# Patient Record
Sex: FEMALE | Race: WHITE | Employment: FULL TIME | URBAN - METROPOLITAN AREA
[De-identification: names, ages, dates, MRNs, and addresses within clinical notes are randomized per-mention and may not be internally consistent; named-entity substitution may affect disease eponyms.]

---
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
## 2018-02-11 ENCOUNTER — HOSPITAL ENCOUNTER (INPATIENT)
Age: 18
LOS: 4 days | Discharge: HOME | DRG: 885 | End: 2018-02-16
Attending: EMERGENCY MEDICINE
Payer: COMMERCIAL

## 2018-02-11 DIAGNOSIS — R45.851 SUICIDAL IDEATIONS: ICD-10-CM

## 2018-02-11 DIAGNOSIS — F51.04 PSYCHOPHYSIOLOGICAL INSOMNIA: ICD-10-CM

## 2018-02-11 DIAGNOSIS — T50.904A DRUG OVERDOSE, UNDETERMINED INTENT, INITIAL ENCOUNTER: Primary | ICD-10-CM

## 2018-02-11 PROBLEM — F32.9 MDD (MAJOR DEPRESSIVE DISORDER): Status: ACTIVE | Noted: 2018-02-11

## 2018-02-11 LAB
ALBUMIN SERPL-MCNC: 4 G/DL (ref 3.5–5)
ALBUMIN/GLOB SERPL: 1 {RATIO} (ref 1.1–2.2)
ALP SERPL-CCNC: 70 U/L (ref 45–117)
ALT SERPL-CCNC: 22 U/L (ref 12–78)
AMPHET UR QL SCN: NEGATIVE
ANION GAP SERPL CALC-SCNC: 5 MMOL/L (ref 5–15)
APAP SERPL-MCNC: <2 UG/ML (ref 10–30)
APPEARANCE UR: CLEAR
AST SERPL-CCNC: 19 U/L (ref 15–37)
ATRIAL RATE: 75 BPM
BACTERIA URNS QL MICRO: NEGATIVE /HPF
BARBITURATES UR QL SCN: NEGATIVE
BASOPHILS # BLD: 0 K/UL (ref 0–0.1)
BASOPHILS NFR BLD: 0 % (ref 0–1)
BENZODIAZ UR QL: POSITIVE
BILIRUB SERPL-MCNC: 0.3 MG/DL (ref 0.2–1)
BILIRUB UR QL: NEGATIVE
BUN SERPL-MCNC: 15 MG/DL (ref 6–20)
BUN/CREAT SERPL: 21 (ref 12–20)
CALCIUM SERPL-MCNC: 9.1 MG/DL (ref 8.5–10.1)
CALCULATED P AXIS, ECG09: 66 DEGREES
CALCULATED R AXIS, ECG10: 75 DEGREES
CALCULATED T AXIS, ECG11: 32 DEGREES
CANNABINOIDS UR QL SCN: POSITIVE
CHLORIDE SERPL-SCNC: 103 MMOL/L (ref 97–108)
CO2 SERPL-SCNC: 29 MMOL/L (ref 21–32)
COCAINE UR QL SCN: NEGATIVE
COLOR UR: NORMAL
CREAT SERPL-MCNC: 0.73 MG/DL (ref 0.55–1.02)
DIAGNOSIS, 93000: NORMAL
DIFFERENTIAL METHOD BLD: NORMAL
DRUG SCRN COMMENT,DRGCM: ABNORMAL
EOSINOPHIL # BLD: 0.1 K/UL (ref 0–0.4)
EOSINOPHIL NFR BLD: 2 % (ref 0–7)
EPITH CASTS URNS QL MICRO: NORMAL /LPF
ERYTHROCYTE [DISTWIDTH] IN BLOOD BY AUTOMATED COUNT: 12.6 % (ref 11.5–14.5)
ETHANOL SERPL-MCNC: <10 MG/DL
GLOBULIN SER CALC-MCNC: 4.2 G/DL (ref 2–4)
GLUCOSE SERPL-MCNC: 81 MG/DL (ref 65–100)
GLUCOSE UR STRIP.AUTO-MCNC: NEGATIVE MG/DL
HCG UR QL: NEGATIVE
HCT VFR BLD AUTO: 40.4 % (ref 35–47)
HGB BLD-MCNC: 13.9 G/DL (ref 11.5–16)
HGB UR QL STRIP: NEGATIVE
HYALINE CASTS URNS QL MICRO: NORMAL /LPF (ref 0–5)
IMM GRANULOCYTES # BLD: 0 K/UL (ref 0–0.04)
IMM GRANULOCYTES NFR BLD AUTO: 0 % (ref 0–0.5)
KETONES UR QL STRIP.AUTO: NEGATIVE MG/DL
LEUKOCYTE ESTERASE UR QL STRIP.AUTO: NEGATIVE
LYMPHOCYTES # BLD: 2.5 K/UL (ref 0.8–3.5)
LYMPHOCYTES NFR BLD: 34 % (ref 12–49)
MCH RBC QN AUTO: 32.3 PG (ref 26–34)
MCHC RBC AUTO-ENTMCNC: 34.4 G/DL (ref 30–36.5)
MCV RBC AUTO: 93.7 FL (ref 80–99)
METHADONE UR QL: NEGATIVE
MONOCYTES # BLD: 0.6 K/UL (ref 0–1)
MONOCYTES NFR BLD: 9 % (ref 5–13)
NEUTS SEG # BLD: 4 K/UL (ref 1.8–8)
NEUTS SEG NFR BLD: 55 % (ref 32–75)
NITRITE UR QL STRIP.AUTO: NEGATIVE
NRBC # BLD: 0 K/UL (ref 0–0.01)
NRBC BLD-RTO: 0 PER 100 WBC
OPIATES UR QL: NEGATIVE
P-R INTERVAL, ECG05: 152 MS
PCP UR QL: NEGATIVE
PH UR STRIP: 6.5 [PH] (ref 5–8)
PLATELET # BLD AUTO: 213 K/UL (ref 150–400)
PMV BLD AUTO: 10.4 FL (ref 8.9–12.9)
POTASSIUM SERPL-SCNC: 3.3 MMOL/L (ref 3.5–5.1)
PROT SERPL-MCNC: 8.2 G/DL (ref 6.4–8.2)
PROT UR STRIP-MCNC: NEGATIVE MG/DL
Q-T INTERVAL, ECG07: 396 MS
QRS DURATION, ECG06: 72 MS
QTC CALCULATION (BEZET), ECG08: 442 MS
RBC # BLD AUTO: 4.31 M/UL (ref 3.8–5.2)
RBC #/AREA URNS HPF: NORMAL /HPF (ref 0–5)
SALICYLATES SERPL-MCNC: <1.7 MG/DL (ref 2.8–20)
SODIUM SERPL-SCNC: 137 MMOL/L (ref 136–145)
SP GR UR REFRACTOMETRY: 1.01 (ref 1–1.03)
UROBILINOGEN UR QL STRIP.AUTO: 0.2 EU/DL (ref 0.2–1)
VENTRICULAR RATE, ECG03: 75 BPM
WBC # BLD AUTO: 7.3 K/UL (ref 3.6–11)
WBC URNS QL MICRO: NORMAL /HPF (ref 0–4)

## 2018-02-11 PROCEDURE — 96360 HYDRATION IV INFUSION INIT: CPT

## 2018-02-11 PROCEDURE — 81001 URINALYSIS AUTO W/SCOPE: CPT | Performed by: EMERGENCY MEDICINE

## 2018-02-11 PROCEDURE — 90791 PSYCH DIAGNOSTIC EVALUATION: CPT

## 2018-02-11 PROCEDURE — 74011250636 HC RX REV CODE- 250/636: Performed by: EMERGENCY MEDICINE

## 2018-02-11 PROCEDURE — 93005 ELECTROCARDIOGRAM TRACING: CPT

## 2018-02-11 PROCEDURE — 51701 INSERT BLADDER CATHETER: CPT

## 2018-02-11 PROCEDURE — 80307 DRUG TEST PRSMV CHEM ANLYZR: CPT | Performed by: EMERGENCY MEDICINE

## 2018-02-11 PROCEDURE — 80053 COMPREHEN METABOLIC PANEL: CPT | Performed by: EMERGENCY MEDICINE

## 2018-02-11 PROCEDURE — 81025 URINE PREGNANCY TEST: CPT

## 2018-02-11 PROCEDURE — 77030005563 HC CATH URETH INT MMGH -A

## 2018-02-11 PROCEDURE — 96361 HYDRATE IV INFUSION ADD-ON: CPT

## 2018-02-11 PROCEDURE — 99285 EMERGENCY DEPT VISIT HI MDM: CPT

## 2018-02-11 PROCEDURE — 85025 COMPLETE CBC W/AUTO DIFF WBC: CPT | Performed by: EMERGENCY MEDICINE

## 2018-02-11 PROCEDURE — 36415 COLL VENOUS BLD VENIPUNCTURE: CPT | Performed by: EMERGENCY MEDICINE

## 2018-02-11 RX ADMIN — SODIUM CHLORIDE 1000 ML: 900 INJECTION, SOLUTION INTRAVENOUS at 04:46

## 2018-02-11 RX ADMIN — SODIUM CHLORIDE 1000 ML: 900 INJECTION, SOLUTION INTRAVENOUS at 17:14

## 2018-02-11 RX ADMIN — SODIUM CHLORIDE 1000 ML: 900 INJECTION, SOLUTION INTRAVENOUS at 13:39

## 2018-02-11 NOTE — ED NOTES
3:39 PM  Care assumed from Dr Anabella Westfall. Waiting on psych reevaluation. Will ambulate pt again in an hour. I went and evaluated pt. She is awake and alert. IVF are running. I spoke to pt for 10 min and she is in no distress, speaking and making sense. 5:17 PM  Pt walked around the nurses station with the nurse. Pt is talking and making sense. Vitals are WNL     BSMART will discuss w/ psychiatry    PROGRESS NOTE:  7:05 PM  Pt is up and walking around unassisted. Safe to be admitted to psychiatry.      PROGRESS NOTE:  7:26 PM  Psychiatry was concerned about the pt's blood pressure being too low earlier in the day, but it has since normalized. Pt is up ambulating and talking normally. Pt's most recent BP was 121/78 with pulse of 64.     I discussed case w/ psychiatry attending who will admit 8pm

## 2018-02-11 NOTE — ED NOTES
Patient alert, but lethargic. RN able to assist patient to [facility].     RN spoke with poison control, will continue to monitor

## 2018-02-11 NOTE — IP AVS SNAPSHOT
[address] 
[phone] Patient: Sujata Barnes MRN: [id number] IVL:1/97/9278 A check uriel indicates which time of day the medication should be taken. My Medications START taking these medications Instructions Each Dose to Equal  
 Morning Noon Evening Bedtime  
 citalopram 20 mg tablet Commonly known as:  Sosa Rajan Your next dose is:  Tomorrow at 9am  
   
 Take 1 Tab by mouth daily. Indications: Generalized Anxiety Disorder, major depressive disorder 20 mg  
    
  
   
   
   
  
 cloNIDine HCl 0.1 mg tablet Commonly known as:  CATAPRES Take 1 Tab by mouth nightly. Indications: ptsd, insomnia  
 0.1 mg  
    
   
   
   
  
 hydrOXYzine HCl 25 mg tablet Commonly known as:  ATARAX Take 1 Tab by mouth every six (6) hours as needed (anxiety) for up to 10 days. Indications: anxiety 25 mg  
    
   
   
   
  
 zolpidem 10 mg tablet Commonly known as:  AMBIEN Your next dose is: Tonight at bedtime Take 1 Tab by mouth nightly as needed for Sleep. Max Daily Amount: 10 mg. Indications: SLEEP-ONSET INSOMNIA 10 mg CONTINUE taking these medications Instructions Each Dose to Equal  
 Morning Noon Evening Bedtime MICROGESTIN FE 1/20 (28) 1 mg-20 mcg (21)/75 mg (7) Tab Generic drug:  norethindrone-ethinyl estradiol Your next dose is:  Tomorrow at 9am  
   
 Take 1 Tab by mouth daily. Indications: Pregnancy Contraception 1 Tab Where to Get Your Medications Information on where to get these meds will be given to you by the nurse or doctor. ! Ask your nurse or doctor about these medications  
  citalopram 20 mg tablet  
 cloNIDine HCl 0.1 mg tablet  
 hydrOXYzine HCl 25 mg tablet  
 zolpidem 10 mg tablet

## 2018-02-11 NOTE — ED NOTES
Patient and mother becoming agitated. Patient asking for personal belongings, RN explained protocol to patient that belongings will be secured at nurses station for safety reasons. Will ambulate patient at 1900. Patient aware.

## 2018-02-11 NOTE — LETTER
[address] Adult Emergency Department [address] [facility] [phone] 
[phone] Work/School Note Date: 2/11/2018 To Whom It May concern: 
 
Kimberly Leavitt was seen and treated today in the emergency room and subsequently admitted to the hospital by the following provider(s): 
Attending Provider: Danielle Schmitz MD. renee Rajesh was present with her daughter during her stay. Sincerely, Marshall Silveira RN

## 2018-02-11 NOTE — ED NOTES
Loud thud heard from room. This RN found patient standing up off of floor beside ED stretcher. Patient reports needing to urinate and climbing over bedside rails. Patient denies hitting head and denies pain at this time, concerned about needing to call work. Patient assisted to bedside commode and back into stretcher. VSS, no acute distress. Bed low and locked. Siderails up. Call bell within reach and patient redirected to use it when in need of assistance. Will continue to monitor. MD also made aware.

## 2018-02-11 NOTE — BSMART NOTE
This pt at one point was asking to leave and was refusing a psychiatric admission. HP officer in [facility] ER spoke with pt and described this process regarding an ECO and an evaluation for a TDO. Pt admitted to being fearful of a psychiatric admission as she has heard \"scary things about a psychiatric hospital.\"  Pt was provided with information pertaining to a possible admission and answered all questions pt had at this time about psychiatric facilities. Pt seemed more comfortable with an admission and has agreed to come in on a voluntary basis---once medically cleared. Pt is not stable medically nor psychiatrically at this time. Pt was tearful as information was given. Pt reports that \"if I leave the hospital, realistically, I will probably die, so I probably need to come in.\"  Pt is receiving fluids and continuing to be monitored as she was/is a fall risk. Once pt is ambulating independently and vitals are stable and ER physician marks pt as medically cleared, BSMART will contact bed board to reassess possible admission psychiatrically. Bed board has called to report that there may be an appropriate bed at [facility] bed-but case has to be discussed with on call psychiatrist for this facility, Dr. Mindy Stewart at that time.   Kassidy Larose LCSW

## 2018-02-11 NOTE — ED NOTES
Post Fall Documentation      Kimberly Becker unwitnessed fall occurred on 2/11/2018 (Date) at [id number] (Time). The answers to the following questions summarize the fall:     · In the patient's own words,:  · What was he/she doing when he/she fell? Attempting to walk to restroom    · What are his/her complaints? NONE    · Nurse:  · Document observation, treatment, conversation, follow-up, and patient response. Loud thud heard from room. This RN found patient standing up off of floor beside ED stretcher. Patient reports needing to urinate and climbing over bedside rails. Patient denies pain at this time, concerned about needing to call work. Patient assisted to bedside commode and back into stretcher. .    · What was the patient's condition when found (i.e., pain, symptoms, cuts, bruises)? NONE    · What specific complaints did the patient have? NONE    · What did the staff do when patient was found (i.e., vital signs, returned to bed with fall alarm, side rails up)? Assisted to bedside commode, assisted to bed, vitals retaken    Which physician was notified?  Nahomy Hudson, RN

## 2018-02-11 NOTE — ED TRIAGE NOTES
Patient arriving after attempting to kill herself; reports that she feels an accumulation of things building to which she feels suicidal every day. Patient took 20 pills of 2 mg of Xanax (not her prescription). Arriving AOx4, GCS 15 however reports she feels better now with all the xanax she took.

## 2018-02-11 NOTE — BSMART NOTE
Spoke with nurse caring for pt as well as Dr. Natalie Recio to report that pt's medical condition is such that psych is unable to admit at this time. Nurse reports that pt is unable to ambulate on her own at this time. Dr. Natalie Recio is looking into a possible medical TDO as pt wants to leave and is not stable medically for discharge.   Toby Kulkarni, JAISON

## 2018-02-11 NOTE — ED NOTES
Spoke with dr Monik Winn about need for medical clearance. He will call back    1:53 PM  Spoke with  he will not order medical tdo but if needs psych tdo she can get treatment through that. I have spoken with Maciel Lucero who will discuss with [facility]    2:26 PM  Per bsmart pt now wants to be voluntary.  They will reevaluate her when she can walk

## 2018-02-11 NOTE — ED NOTES
Spoke with poison control; reports to expect drowsiness. Recommends discharge when patient mentating appropriately and able to ambulate independantly.

## 2018-02-11 NOTE — BSMART NOTE
At this time, psychiatrist does not feel that this pt is medically stable enough to come to psychiatric unit. Pt has fallen and blood pressure low. Pt is not medically stable for admission to a psychiatric unit. Will discuss this with ER physician.   Toby Kulkarni LCSW

## 2018-02-11 NOTE — ED PROVIDER NOTES
Patient is a 21 y.o. female presenting with Ingested Medication. Drug Overdose   The problem has been gradually worsening. Pertinent negatives include no chest pain, no abdominal pain, no headaches and no shortness of breath. 21year old female with anxiety, presents after overdose on 10mg Xanax that she got from a friend around 2-3am this morning. She states she took them because she wants to die. She states her mom left her at the age of 10 and she has had self worth issues ever since. She states she has tried to kill herself before but never hospitalized. She denies alcohol or other drug use. Does not have a psychiatrist.     Past Medical History:   Diagnosis Date    Anxiety        Past Surgical History:   Procedure Laterality Date    HX WISDOM TEETH EXTRACTION           History reviewed. No pertinent family history. Social History     Social History    Marital status: SINGLE     Spouse name: N/A    Number of children: N/A    Years of education: N/A     Occupational History    Not on file. Social History Main Topics    Smoking status: Current Some Day Smoker    Smokeless tobacco: Never Used    Alcohol use Not on file    Drug use: Yes     Special: Marijuana, Cocaine    Sexual activity: Not on file     Other Topics Concern    Not on file     Social History Narrative         ALLERGIES: Amoxil [amoxicillin]    Review of Systems   Constitutional: Negative for fever. HENT: Negative for congestion. Eyes: Negative for visual disturbance. Respiratory: Negative for cough and shortness of breath. Cardiovascular: Negative for chest pain. Gastrointestinal: Negative for abdominal pain, nausea and vomiting. Endocrine: Negative for polyuria. Genitourinary: Negative for dysuria. Musculoskeletal: Negative for gait problem. Neurological: Negative for headaches. Psychiatric/Behavioral: Positive for dysphoric mood, self-injury and suicidal ideas.        Vitals:    02/11/18 0412   BP: 108/66   Pulse: 73   Resp: 16   Temp: 97.4 °F (36.3 °C)   SpO2: 100%   Weight: 45.4 kg (100 lb)   Height: 5' 2\" (1.575 m)            Physical Exam   Constitutional: She is oriented to person, place, and time. She appears well-developed and well-nourished. No distress. HENT:   Head: Normocephalic and atraumatic. Mouth/Throat: Oropharynx is clear and moist. No oropharyngeal exudate. Eyes: Conjunctivae and EOM are normal. Pupils are equal, round, and reactive to light. Right eye exhibits no discharge. Left eye exhibits no discharge. No scleral icterus. Neck: Normal range of motion. Neck supple. No JVD present. Cardiovascular: Normal rate, regular rhythm, normal heart sounds and intact distal pulses. Exam reveals no gallop and no friction rub. No murmur heard. Pulmonary/Chest: Effort normal and breath sounds normal. No stridor. No respiratory distress. She has no wheezes. She has no rales. She exhibits no tenderness. Abdominal: Soft. Bowel sounds are normal. She exhibits no distension and no mass. There is no tenderness. There is no rebound and no guarding. Musculoskeletal: Normal range of motion. She exhibits no edema or tenderness. Neurological: She is alert and oriented to person, place, and time. She has normal reflexes. No cranial nerve deficit. She exhibits normal muscle tone. Coordination normal.   Skin: Skin is warm and dry. No rash noted. No erythema. Psychiatric: Her behavior is normal. Judgment and thought content normal.   Flat affect. Sleepy but easily arousable. Can sit up and carry on a conversation        MDM      ED Course       Procedures      ED EKG interpretation:  Rhythm: normal sinus rhythm; and regular . Rate (approx.): 75; Axis: normal; P wave: normal; QRS interval: normal ; ST/T wave: non-specific changes. This EKG was interpreted by Enzo Art MD,ED Provider. Patient seen by Nadege Barbosa- agrees patient needs admission to psych floor. Will medically clear.      Medically cleared. Patient now saying she isn't sure she wants to stay. New BSMART in morning to come evaluate- consider TDO.

## 2018-02-11 NOTE — ED NOTES
Bedside and Verbal shift change report given to Andrey Aqq. 291 and Radha RN (oncoming nurse) by Leighton Yang and Jhoan Matamoros RN (offgoing nurse). Report included the following information SBAR, Kardex, ED Summary and MAR. PT was very difficult to arouse to painful stimuli; aroused after multiple sternal rubs. PT is confused.

## 2018-02-11 NOTE — BSMART NOTE
This evaluator has spoken with this pt today regarding admission to a psychiatric facility due to her presentation and actions prior to this ED admission. Pt, although not happy to miss work and be admitted psychiatrically, is willing to be admitted and a TDO evaluation is not warranted at this time. A bed at [facility] may become available after the psychiatrist makes rounds on the units today. Bed Board, Joe Moreno, is aware of this case and is hoping a bed will become available at this facility versus a transfer to another psychiatric facility. Pt is quiet but cooperative at this time.   Dee Armendariz, JAISON

## 2018-02-11 NOTE — ED NOTES
Pt found incontinent of urine. Straight cath performed to obtain urine sample for drug screen - no urine obtained. Pt cleansed and bedding changed.

## 2018-02-11 NOTE — BSMART NOTE
Comprehensive Assessment Form Part 1      Section I - Disposition    Axis I - Major Depressive d/o, recurrent, severe, without psychotic features  Axis II - deferred  Axis III - MRSA by hx (4/6/17)  Axis IV - multiple life stressors (work, school, room mates, family)   [place] V - 44      The Medical Doctor to Psychiatrist conference was not completed. Medical doctor is in agreement with psychiatrist disposition because this counselor conveyed to ED physician the recommendation of the on-call psychiatrist and they concurred. The plan is TBD. The on-call Psychiatrist consulted was Dr. Steven Stanley. The admitting Psychiatrist will be Dr. Carla Dueñas. 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 \" Patient is very lethargic but able to complete assessment. She reports several life stressors to include: stress at work ( at [employer]), relational problems with sorority sisters at [employer] with whom she lives, \"mom won't talk to me\", and \"grandmother has stopped calling me. \" Patient says she has been depressed since her mother left when she was 9 yo. Patient reports history of superficial cutting because \"the physical pain takes away the emotional pain. \"  Patient denies homicidal ideation, denies auditory/visual hallucinations, is not delusional, and is oriented X3. Patient's ETOH was not ordered, drug screen and pregnancy test are pending. (nurse attempted straight cath with no success).    Patient has no history of psych admissions, reports no previous suicide attempts, is not followed by a psychiatrist or counselor, or prescribed any psych medications. She reports poor sleep for the past 6 months getting about 5 hours sleep per night. She is currently a freshman at [employer] and says she usually lives with her parents live in [place]. Patient is amenable to a voluntary psychiatric admission. NOTE: Bed board reports no beds available at either [facility] or [facility] at this time. Once patient is cleared medically, lucid, and can ambulate next Bsmart counselor should call bed board to check on bed availability. The patient has demonstrated mental capacity to provide informed consent. The information is given by the patient and past medical records. The Chief Complaint is depression. The Precipitant Factors are multiple life stressors. Previous Hospitalizations: none reported  The patient has not previously been in restraints. Current Psychiatrist and/or  is n/a. Lethality Assessment:    The potential for suicide noted by the following: intent, current attempt, ideation and means . The potential for homicide is not noted. The patient has not been a perpetrator of sexual or physical abuse. There are not pending charges. The patient is felt to be at risk for self harm or harm to others. The attending nurse was advised to remove potentially harmful or dangerous items from the patient's room , to request a search of the patient's belongings, to remove patient clothing and place it out of immediate access to the patient, the patient is at risk for self harm and the patient needs supervision. Section III - Psychosocial  The patient's overall mood and attitude is depressed. Feelings of helplessness and hopelessness are not observed. Generalized anxiety is not observed. Panic is not observed. Phobias are not observed. Obsessive compulsive tendencies are not observed. Section IV - Mental Status Exam  The patient's appearance shows no evidence of impairment. The patient's behavior is guarded.  The patient is oriented to place, person and situation. The patient's speech is slowed and is soft. The patient's mood is depressed, is withdrawn and is sad. The range of affect is flat. The patient's thought content demonstrates no evidence of impairment. The thought process shows no evidence of impairment. The patient's perception shows no evidence of impairment. The patient's memory shows no evidence of impairment. The patient's appetite shows no evidence of impairment. The patient's sleep shows no evidence of impairment. The patient's insight shows no evidence of impairment. The patient's judgement shows no evidence of impairment. Section V - Substance Abuse  The patient is using substances. The patient is using benzodiazepines/barbiturates orally for 1-5 years with last use on 2/10/18. The patient has experienced the following withdrawal symptoms: N/A. Section VI - Living Arrangements  The patient is single. The patient lives with 7 room mates. The patient has no children. The patient does plan to return home upon discharge. The patient does not have legal issues pending. The patient's source of income comes from employment and family. Anglican and cultural practices have not been voiced at this time. The patient's greatest support comes from school counselor and this person will be involved with the treatment. The patient has not been in an event described as horrible or outside the realm of ordinary life experience either currently or in the past.  The patient has not been a victim of sexual/physical abuse. Section VII - Other Areas of Clinical Concern  The highest grade achieved is 1 year college with the overall quality of school experience being described as poor. The patient is currently a student and speaks [place] as a primary language. The patient has no communication impairments affecting communication.  The patient's preference for learning can be described as: can read and write adequately.   The patient's hearing is normal.  The patient's vision is normal.      Yvonne Cummins, LPC

## 2018-02-11 NOTE — ED NOTES
Patient contracts for safety while in ER; pt changed into gown and personal belongings placed at nurses station. Remains within site of nurses station.

## 2018-02-11 NOTE — IP AVS SNAPSHOT
[address] 
[phone] Patient: Desiree Davis MRN: [id number] EIY:[birth year] About your hospitalization You were admitted on:  February 12, 2018 You last received care in the:  [address] [place] [address] You were discharged on:  February 16, 2018 Why you were hospitalized Your primary diagnosis was:  Mdd (Major Depressive Disorder) Follow-up Information Follow up With Details Comments Contact Info Coleen Noel On 3/14/2018 You have a 10:30am appointment with the psychiatric nurse practitioner for medication management. [facility] 
[address] [facility], [place] [place], [address] [facility] 
[phone] Daniel Phipps On 2/28/2018 You have a 3:00pm appointment for therapy. [facility], [address] [facility] 
[phone] [facility] Call Please call to register. Individuals will learn to have greater distress tolerance, improved interpersonal effectiveness, increased emotional regulation, and the ability to be \"mindful\" rather than \"mind full. \" [facility]. [address] 
[phone] Sophie Rice MD   [address] [place] [address] 
[phone] Discharge Orders None A check uriel indicates which time of day the medication should be taken. My Medications START taking these medications Instructions Each Dose to Equal  
 Morning Noon Evening Bedtime  
 citalopram 20 mg tablet Commonly known as:  Viki Hernandez Your next dose is:  Tomorrow at 9am  
   
 Take 1 Tab by mouth daily. Indications: Generalized Anxiety Disorder, major depressive disorder 20 mg  
    
  
   
   
   
  
 cloNIDine HCl 0.1 mg tablet Commonly known as:  CATAPRES Take 1 Tab by mouth nightly.  Indications: ptsd, insomnia  
 0.1 mg  
    
   
 hydrOXYzine HCl 25 mg tablet Commonly known as:  ATARAX Take 1 Tab by mouth every six (6) hours as needed (anxiety) for up to 10 days. Indications: anxiety 25 mg  
    
   
   
   
  
 zolpidem 10 mg tablet Commonly known as:  AMBIEN Your next dose is: Tonight at bedtime Take 1 Tab by mouth nightly as needed for Sleep. Max Daily Amount: 10 mg. Indications: SLEEP-ONSET INSOMNIA 10 mg CONTINUE taking these medications Instructions Each Dose to Equal  
 Morning Noon Evening Bedtime MICROGESTIN FE  (28) 1 mg-20 mcg (21)/75 mg (7) Tab Generic drug:  norethindrone-ethinyl estradiol Your next dose is:  Tomorrow at 9am  
   
 Take 1 Tab by mouth daily. Indications: Pregnancy Contraception 1 Tab Where to Get Your Medications Information on where to get these meds will be given to you by the nurse or doctor. ! Ask your nurse or doctor about these medications  
  citalopram 20 mg tablet  
 cloNIDine HCl 0.1 mg tablet  
 hydrOXYzine HCl 25 mg tablet  
 zolpidem 10 mg tablet Discharge Instructions DISCHARGE SUMMARY 
 
NAME:Kimberly Rodas : 2000 MRN: [id number] The patient Maddy Duval exhibits the ability to control behavior in a less restrictive environment. Patient's level of functioning is improving. No assaultive/destructive behavior has been observed for the past 24 hours. No suicidal/homicidal threat or behavior has been observed for the past 24 hours. There is no evidence of serious medication side effects. Patient has not been in physical or protective restraints for at least the past 24 hours. If weapons involved, how are they secured? No weapons involved. Is patient aware of and in agreement with discharge plan? Yes Arrangements for medication:  Prescriptions given to patient. Referral for substance abuse treatment? Yes, Wilmer Elizalde Referral for smoking cessation needed? Yes, refused Copy of discharge instructions to provider?:  Sulema Cowan ([phone]); Joshua Norma ([phone]) Arrangements for transportation home:  Family to . Keep all follow up appointments as scheduled, continue to take prescribed medications per physician instructions. Mental health crisis number:  458 or your local mental health crisis line number at [phone]. DISCHARGE SUMMARY from Nurse PATIENT INSTRUCTIONS: 
 
What to do at Home: 
Recommended activity: Activity as tolerated, If you experience any of the following symptoms thoughts of harming self, feeling overwhelmed with anxiety, hopelessness or worthlessness, please follow up with your assigned providers and local crisis number. *  Please give a list of your current medications to your Primary Care Provider. *  Please update this list whenever your medications are discontinued, doses are 
    changed, or new medications (including over-the-counter products) are added. *  Please carry medication information at all times in case of emergency situations. These are general instructions for a healthy lifestyle: No smoking/ No tobacco products/ Avoid exposure to second hand smoke Surgeon General's Warning:  Quitting smoking now greatly reduces serious risk to your health. Obesity, smoking, and sedentary lifestyle greatly increases your risk for illness A healthy diet, regular physical exercise & weight monitoring are important for maintaining a healthy lifestyle You may be retaining fluid if you have a history of heart failure or if you experience any of the following symptoms:  Weight gain of 3 pounds or more overnight or 5 pounds in a week, increased swelling in our hands or feet or shortness of breath while lying flat in bed. Please call your doctor as soon as you notice any of these symptoms; do not wait until your next office visit. Recognize signs and symptoms of STROKE: 
 
F-face looks uneven A-arms unable to move or move unevenly S-speech slurred or non-existent T-time-call 911 as soon as signs and symptoms begin-DO NOT go Back to bed or wait to see if you get better-TIME IS BRAIN. Warning Signs of HEART ATTACK Call 911 if you have these symptoms: 
? Chest discomfort. Most heart attacks involve discomfort in the center of the chest that lasts more than a few minutes, or that goes away and comes back. It can feel like uncomfortable pressure, squeezing, fullness, or pain. ? Discomfort in other areas of the upper body. Symptoms can include pain or discomfort in one or both arms, the back, neck, jaw, or stomach. ? Shortness of breath with or without chest discomfort. ? Other signs may include breaking out in a cold sweat, nausea, or lightheadedness. Don't wait more than five minutes to call [address]! Fast action can save your life. Calling 911 is almost always the fastest way to get lifesaving treatment. Emergency Medical Services staff can begin treatment when they arrive  up to an hour sooner than if someone gets to the hospital by car. The discharge information has been reviewed with the patient. The patient verbalized understanding. Discharge medications reviewed with the patient and appropriate educational materials and side effects teaching were provided. ___________________________________________________________________________________________________________________________________ Virtual[employer] Announcement We are excited to announce that we are making your provider's discharge notes available to you in [employer]. You will see these notes when they are completed and signed by the physician that discharged you from your recent hospital stay.   If you have any questions or concerns about any information you see in VirtualUhart, please call the shopa Department where you were seen or reach out to your Primary Care Provider for more information about your plan of care. Introducing [facility]! Scarmissy Brown introduces [employer] patient portal. Now you can access parts of your medical record, email your doctor's office, and request medication refills online. 1. In your internet browser, go to [email or web address] 2. Click on the First Time User? Click Here link in the Sign In box. You will see the New Member Sign Up page. 3. Enter your [employer] Access Code exactly as it appears below. You will not need to use this code after youve completed the sign-up process. If you do not sign up before the expiration date, you must request a new code. · [employer] Access Code: [email or web address] Expires: 5/17/2018 11:48 AM 
 
4. Enter the last four digits of your Social Security Number (xxxx) and Date of Birth (mm/dd/yyyy) as indicated and click Submit. You will be taken to the next sign-up page. 5. Create a [employer] ID. This will be your [employer] login ID and cannot be changed, so think of one that is secure and easy to remember. 6. Create a [employer] password. You can change your password at any time. 7. Enter your Password Reset Question and Answer. This can be used at a later time if you forget your password. 8. Enter your e-mail address. You will receive e-mail notification when new information is available in [address] 9. Click Sign Up. You can now view and download portions of your medical record. 10. Click the Download Summary menu link to download a portable copy of your medical information. If you have questions, please visit the Frequently Asked Questions section of the [employer] website. Remember, [employer] is NOT to be used for urgent needs. For medical emergencies, dial 911. Now available from your iPhone and Android! Providers Seen During Your Hospitalization Provider Specialty Primary office phone Ani Patino MD Emergency Medicine [phone] Daniela Bay MD Emergency Medicine [phone] Rose Mary Caal MD Emergency Medicine [phone] Apolinar Morales MD Psychiatry [phone] Dale Degroot MD Psychiatry [phone] Immunizations Administered for This Admission Name Date Influenza Vaccine (Quad) PF 2/14/2018 Your Primary Care Physician (PCP) Primary Care Physician Office Phone Office Fax Unknown Harlan [phone] You are allergic to the following Allergen Reactions Amoxil (Amoxicillin) Hives Recent Documentation Height Weight BMI OB Status Smoking Status 1.575 m (18 %, Z= -0.90)* 45.4 kg (3 %, Z= -1.82)* 18.29 kg/m2 (8 %, Z= -1.38)* Having regular periods Current Some Day Smoker *Growth percentiles are based on CDC 2-20 Years data. Emergency Contacts Name Discharge Info Relation Home Work Mobile Kim Bojorquez DISCHARGE CAREGIVER [3] Parent [1]   [phone] Patient Belongings The following personal items are in your possession at time of discharge: 
  Dental Appliances: None  Visual Aid: None      Home Medications: None   Jewelry: None  Clothing: Pants, Shirt, Sweater (Sweater, shirt, pants)    Other Valuables: None  Personal Items Sent to Safe: None Please provide this summary of care documentation to your next provider. Signatures-by signing, you are acknowledging that this After Visit Summary has been reviewed with you and you have received a copy. Patient Signature:  ____________________________________________________________ Date:  ____________________________________________________________  
  
Kaylin Sleeper Provider Signature:  ____________________________________________________________ Date:  ____________________________________________________________

## 2018-02-11 NOTE — LETTER
[address] Emergency Department [address] [facility] [id number] 
[phone] Work/School Note Date: 2/11/2018 To Whom It May concern: 
 
Kimberly Ponce was seen and treated today in the emergency room by the following provider(s): 
Attending Provider: Ruth Bradley MD. Gerry Edwards was present with her daughter. Sincerely, Levar Jack RN

## 2018-02-11 NOTE — ED NOTES
8:01 AM  Change of shift. Care of patient taken over from dr painter; H&P reviewed, handoff complete. Awaiting labs/imaging/consultant. 10:21 AM  Pt seen by jennifer fisher she is still ooking for a bed    12:50 PM per rn pt tried to climb over bed rail and fell. Pt denying no evidence head injury and she denies.  FROM of all extremities no back pain

## 2018-02-12 PROCEDURE — 74011250637 HC RX REV CODE- 250/637: Performed by: PSYCHIATRY & NEUROLOGY

## 2018-02-12 PROCEDURE — 65220000003 HC RM SEMIPRIVATE PSYCH

## 2018-02-12 RX ORDER — IBUPROFEN 200 MG
1 TABLET ORAL
Status: DISCONTINUED | OUTPATIENT
Start: 2018-02-12 | End: 2018-02-16 | Stop reason: HOSPADM

## 2018-02-12 RX ORDER — NORETHINDRONE ACETATE AND ETHINYL ESTRADIOL 1MG-20(21)
1 KIT ORAL DAILY
COMMUNITY

## 2018-02-12 RX ORDER — MIRTAZAPINE 15 MG/1
7.5 TABLET, FILM COATED ORAL
Status: DISCONTINUED | OUTPATIENT
Start: 2018-02-12 | End: 2018-02-13

## 2018-02-12 RX ORDER — IBUPROFEN 400 MG/1
400 TABLET ORAL
Status: DISCONTINUED | OUTPATIENT
Start: 2018-02-12 | End: 2018-02-16 | Stop reason: HOSPADM

## 2018-02-12 RX ORDER — OLANZAPINE 2.5 MG/1
2.5 TABLET ORAL
Status: DISCONTINUED | OUTPATIENT
Start: 2018-02-12 | End: 2018-02-16 | Stop reason: HOSPADM

## 2018-02-12 RX ORDER — ZOLPIDEM TARTRATE 5 MG/1
5 TABLET ORAL
Status: DISCONTINUED | OUTPATIENT
Start: 2018-02-12 | End: 2018-02-14

## 2018-02-12 RX ORDER — ADHESIVE BANDAGE
30 BANDAGE TOPICAL DAILY PRN
Status: DISCONTINUED | OUTPATIENT
Start: 2018-02-12 | End: 2018-02-16 | Stop reason: HOSPADM

## 2018-02-12 RX ORDER — CITALOPRAM 20 MG/1
10 TABLET, FILM COATED ORAL DAILY
Status: DISCONTINUED | OUTPATIENT
Start: 2018-02-13 | End: 2018-02-14

## 2018-02-12 RX ORDER — BENZTROPINE MESYLATE 1 MG/1
1 TABLET ORAL
Status: DISCONTINUED | OUTPATIENT
Start: 2018-02-12 | End: 2018-02-16 | Stop reason: HOSPADM

## 2018-02-12 RX ORDER — BENZTROPINE MESYLATE 1 MG/ML
1 INJECTION INTRAMUSCULAR; INTRAVENOUS
Status: DISCONTINUED | OUTPATIENT
Start: 2018-02-12 | End: 2018-02-16 | Stop reason: HOSPADM

## 2018-02-12 RX ORDER — ACETAMINOPHEN 325 MG/1
650 TABLET ORAL
Status: DISCONTINUED | OUTPATIENT
Start: 2018-02-12 | End: 2018-02-16 | Stop reason: HOSPADM

## 2018-02-12 RX ADMIN — MIRTAZAPINE 7.5 MG: 15 TABLET, FILM COATED ORAL at 21:23

## 2018-02-12 NOTE — PROGRESS NOTES
Problem: Depressed Mood (Adult/Pediatric)  Goal: *STG: Remains safe in hospital  Outcome: Progressing Towards Goal  Pt out in milieu with peers for brief periods. Affect somewhat sad and depressed. Seems to enjoy coloring in room at times. Staff will continue to monitor q 15 min checks.

## 2018-02-12 NOTE — PROGRESS NOTES
Admission Medication Reconciliation:    Information obtained from:  Communication with Manuel Quintanilla ([phone])    Comments/Recommendations: Updated PTA meds/reviewed patient's allergies. 1)  Called pharmacy and patient has not filled at [facility] since 11/2017 (birth control) for 1 month        supply. Patient was previously prescribed sertraline in 8/2016. 2)  Removed:       - cefdinir       - diphenoxylate/atropine       - ondansetron       - oxycodone/APAP       - sertraline    3)  Changed:       - oral contraception changed to Microgestin FE 1/20       Significant PMH/Disease States:   Past Medical History:   Diagnosis Date    Anxiety      Chief Complaint for this Admission:    Chief Complaint   Patient presents with    Drug Overdose     Allergies:  Amoxil [amoxicillin]    Prior to Admission Medications:   Prior to Admission Medications   Prescriptions Last Dose Informant Patient Reported? Taking?   norethindrone-ethinyl estradiol (MICROGESTIN FE 1/20, 28,) 1 mg-20 mcg (21)/75 mg (7) tab   Yes No   Sig: Take 1 Tab by mouth daily.  Indications: Pregnancy Contraception      Facility-Administered Medications: None     Leticia Morrell, PharmD, BCPP, BCPS  Clinical Pharmacy Specialist, [facility]

## 2018-02-12 NOTE — BH NOTES
GROUP THERAPY PROGRESS NOTE    Kimberly Angeles participated in the Acute Unit's afternoon Process Group, with a focus on identifying feelings, planning for the rest of the day, and preparing for discharge. Group time: 50 minutes. Personal goal for participation: To increase the capacity to improve ones mood and structure. Goal orientation: The patient will be able to identify their feelings, develop a plan for structuring their day, and discharge planning. Group therapy participation: With prompting, this patient participated in the group. Therapeutic interventions reviewed and discussed: The group members were asked to introduce themselves to each other and to see if they could identify an emotion they are having and/or let the group know what they want to focus on for the day as they continue to make discharge plans. Impression of participation: The patient initially said she was feeling \"cold\" and left the room briefly to get a blanket from her room. She said she was feeling \"better\" and \"worried\" about a lot of things she might address if she were out of the hospital. She suggested, for example, that her car loan was due and she did not know how to deal with it from the hospital. She claimed not to have her credit/debit card with her and that she did not know where her wallet might be. It was suggested that she could try to call the lender of the loan to explain her situation so they know she is not trying to duck them. She said she doesn't have their telephone number and that she has not used information 21 [phone] or [phone]) before. She was encouraged to ask the staff for help with this if she needed to. She said she was looking forward to seeing her boyfriend tonight during visiting hours at 7 PM this evening. Aside from her boyfriend she said she feels no value in her work or school.  She expressed no SI/HI but admitted feeling as if she could not see a future for herself and was thinking about dropping out of school. She was alert and generally oriented. She displayed no overt psychotic symptoms in this group. Her affect was very depressed, with some anxiety. Her mood matched her affect. She expressed a deep sense of emptiness in her life, aside from her boyfriend.

## 2018-02-12 NOTE — ED NOTES
Patient updated on plan for waiting for inpatient bed; patient stated that she was ready to go and would like to leave. Patient reminded of status of potential TDO if she did not remain voluntary. Patient stated \"do whatever you have to do'. Christa Zapata MD aware, Gallo Gonzalez [facility]) aware. Gallo Gonzalez at bedside to discuss.

## 2018-02-12 NOTE — ROUTINE PROCESS
TRANSFER - IN REPORT:    Verbal report received from Lucita Shaffer on [address]  being received from [facility] ED for routine progression of care      Report consisted of patients Situation, Background, Assessment and   Recommendations(SBAR). Information from the following report(s) SBAR was reviewed with the receiving nurse. Opportunity for questions and clarification was provided. Assessment completed upon patients arrival to unit and care assumed.

## 2018-02-12 NOTE — ROUTINE PROCESS
Primary Nurse Neela Arora and Anjel Caal RN performed a dual skin assessment on this patient No impairment noted  Elkin score is 23    Pressure Injury Documentation  (COMPLETE ONE LABEL PER PRESSURE INJURY)  For further information, please review corresponding Wound Care flowsheet. Pt noted to have Multiple tattos over body areas; Bruise on Left Hip (per pt - from fall PTA); 3 earrings in Left ear and 2 earrings in R ear.

## 2018-02-12 NOTE — PROGRESS NOTES
Problem: Suicide/Homicide (Adult/Pediatric) Meet by 2/20/18  Goal: *STG: Remains safe in hospital  Outcome: Progressing Towards Goal  Pt remains safe in hospital on q 15 min safety checks. No self harming behaviors observed. Pt reports having SI. Contracts for safety. Goal: *STG: Seeks staff when feelings of self harm or harm towards others arise  Outcome: Progressing Towards Goal  Pt alert oriented. Anxious calm. Goal: *STG: Attends activities and groups  Outcome: Progressing Towards Goal  Pt encouraged to attend groups and activities. Pt visible on unit. Showered. Pt meeting with treatment team. Pt tearful. Reports poor sleep, unmanaged anxiety and depression. Discuss history of trauma. Plan to start Celexa for depression, Remeron at bedtime. Journal provided. Pt attending process group.        [address] [id number]  Master Treatment Plan for Boogie Ho    Date Treatment Plan Initiated: 2/12/18    Treatment Plan Modalities:  Type of Modality Amount  (x minutes) Frequency (x/week) Duration (x days) Name of Responsible Staff   Community & wrap-up meetings to encourage peer interactions 15 7 1    Mick PEARL   Group psychotherapy to assist in building coping skills and internal controls 60 7 1 Cyrus Pereyra LCSW   Therapeutic activity groups to build coping skills 60 7 1 Cyrus Pereyra LCSW   Psychoeducation in group setting to address:   Medication education   15 7 1 Jackie WONG   Coping skills         Relaxation techniques         Symptom management         Discharge planning         Spirituality    [phone] [facility]   60 1 1 Volunteer from [place]   Recovery/AA/NA   61 3 1 Volunteer from [address] [place] [address] medication management   15 7 1 Dr. Billee Meigs

## 2018-02-12 NOTE — ED NOTES
Patient able to ambulate independently with steady gait. MD and [facility].

## 2018-02-12 NOTE — ED NOTES
Patient becoming belligerent with staff; reports that she wants to leave. Currently awaiting TDO paperwork. [employer] officer at bedside attempting to [facility] patient.

## 2018-02-12 NOTE — ED NOTES
PROGRESS NOTE:  7:05 PM  Pt is up and walking around unassisted. Safe to be admitted to psychiatry. PROGRESS NOTE:  7:26 PM  Psychiatry was concerned about the pt's blood pressure being too low earlier in the day, but it has since normalized. Pt is up ambulating and talking normally. Pt's most recent BP was 121/78 with pulse of 64.
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
## 2018-02-12 NOTE — ROUTINE PROCESS
TRANSFER - OUT REPORT:    Verbal report given to Monse Marinelli RN(name) on Jose Enrique Seaman  being transferred to [address] [place] [address](unit) for routine progression of care       Report consisted of patients Situation, Background, Assessment and   Recommendations(SBAR). Information from the following report(s) SBAR, ED Summary, Procedure Summary, MAR and Recent Results was reviewed with the receiving nurse. Lines:   Peripheral IV 02/11/18 Left Antecubital (Active)   Site Assessment Clean, dry, & intact 2/11/2018  4:14 AM   Phlebitis Assessment 0 2/11/2018  4:14 AM   Infiltration Assessment 0 2/11/2018  4:14 AM   Dressing Status Clean, dry, & intact 2/11/2018  4:14 AM   Hub Color/Line Status Pink;Flushed 2/11/2018  4:14 AM   Action Taken Blood drawn 2/11/2018  4:14 AM        Opportunity for questions and clarification was provided.       Patient transported with:   Registered Nurse

## 2018-02-12 NOTE — ED NOTES
Patient [address] [facility] by DELFINO at [phone]. Remains at bedside. Page out to [facility] to make aware of situation.

## 2018-02-12 NOTE — INTERDISCIPLINARY ROUNDS
Behavioral Health Interdisciplinary Rounds     Patient Name: Michael Anderson  Age: 21 y.o. Room/Bed:  736/  Primary Diagnosis: <principal problem not specified>   Admission Status: TDO     Readmission within 30 days: no  Power of  in place: no  Patient requires a blocked bed: yes          Reason for blocked bed: MRSA    VTE Prophylaxis: No  Flu vaccine given : no - To be administered prior to discharge  Mobility needs/Fall risk: yes    Nutritional Plan: no  Consults:          Labs/Testing due today?: no    Sleep hours: 3.75       Participation in Care/Groups:  No - New Admission  Medication Compliant?: No scheduled Medications ordered. New Admission  PRNS (last 24 hours): None    Restraints (last 24 hours):  no  Substance Abuse:  Yes - (THC)  CIWA (range last 24 hours):  COWS (range last 24 hours):   Alcohol screening (AUDIT) completed -     If applicable, date SBIRT discussed in treatment team AND documented:   Tobacco - patient is a smoker: yes   Date tobacco education completed by RN: 2/12/18 (Needs Reinforcement)  24 hour chart check complete: no - New Admision    Patient goal(s) for today: To not have suicidal thoughts  Treatment team focus/goals: Stabilize on medication  Progress note  Patient tearful and distraught. She is alert and oriented and willing to stay for treatment.     LOS:  0  Expected LOS: TDO hearing, 2/14/18    Financial concerns/prescription coverage:    Date of last family contact:   No    Family requesting physician contact today:  No  Discharge plan:  Return to [facility] when stable for discharge  Guns in the home:  No       Outpatient provider(s): [facility]    Participating treatment team members: Dr. Ana Hong; Claudean Stai, PharmD; Jannie Caldwell

## 2018-02-12 NOTE — BSMART NOTE
Patient is now refusing admission again. Discussed TDO evaluation with patient and in reference to calling [facility] patient stated \"Call them! .\"  Enriqueta Aid and spoke with Alireza Alanis. Faxed requested information and they will respond.

## 2018-02-12 NOTE — BH NOTES
Admission Note:    This 21year old WF was admitted via TDO status from [facility] to the Professional Services of Dr. Genie Brothers. Diagnosis : Major Depressive Disorder. Reason for Admission   Suicide Attempt Was brought to ED via EMS after overdosing on 20 pills of 2 mg Xanax (Was a friends prescription). Precipitating Factors:  Stressors at work, Relationship issues with Sorority sisters at [employer] and depression since age 10 when abandoned by mother. History Of: Superficial cutting because the physical pain takes away the emotional pain.                        MRSA   Substance Abuse History:     Cocaine (Occasionally at parties when offered) & Marijuana (Daily)   Denies Alcohol Use    Unit Presentation:  Calm & cooperative with admission process. Depressed mood; tearful at times. Currently denies suicidal ideation. Currently denies homicidal ideation. Patient verbally contracts for safety. Was given unit handbook and educated on its contents.                            Elkin Score = 23; Mews = 1

## 2018-02-13 LAB
ALBUMIN SERPL-MCNC: 4.5 G/DL (ref 3.5–5)
ALBUMIN/GLOB SERPL: 1 {RATIO} (ref 1.1–2.2)
ALP SERPL-CCNC: 78 U/L (ref 45–117)
ALT SERPL-CCNC: 23 U/L (ref 12–78)
ANION GAP SERPL CALC-SCNC: 6 MMOL/L (ref 5–15)
AST SERPL-CCNC: 25 U/L (ref 15–37)
BACTERIA SPEC CULT: NORMAL
BACTERIA SPEC CULT: NORMAL
BILIRUB SERPL-MCNC: 0.6 MG/DL (ref 0.2–1)
BUN SERPL-MCNC: 13 MG/DL (ref 6–20)
BUN/CREAT SERPL: 16 (ref 12–20)
CALCIUM SERPL-MCNC: 9.4 MG/DL (ref 8.5–10.1)
CHLORIDE SERPL-SCNC: 104 MMOL/L (ref 97–108)
CHOLEST SERPL-MCNC: 238 MG/DL
CO2 SERPL-SCNC: 27 MMOL/L (ref 21–32)
CREAT SERPL-MCNC: 0.81 MG/DL (ref 0.55–1.02)
GLOBULIN SER CALC-MCNC: 4.4 G/DL (ref 2–4)
GLUCOSE P FAST SERPL-MCNC: 82 MG/DL (ref 65–100)
GLUCOSE SERPL-MCNC: 82 MG/DL (ref 65–100)
HDLC SERPL-MCNC: 90 MG/DL
HDLC SERPL: 2.6 {RATIO} (ref 0–5)
LDLC SERPL CALC-MCNC: 133.2 MG/DL (ref 0–100)
LIPID PROFILE,FLP: ABNORMAL
POTASSIUM SERPL-SCNC: 4.7 MMOL/L (ref 3.5–5.1)
PROT SERPL-MCNC: 8.9 G/DL (ref 6.4–8.2)
SERVICE CMNT-IMP: NORMAL
SODIUM SERPL-SCNC: 137 MMOL/L (ref 136–145)
T4 FREE SERPL-MCNC: 1 NG/DL (ref 0.8–1.5)
TRIGL SERPL-MCNC: 74 MG/DL (ref ?–150)
TSH SERPL DL<=0.05 MIU/L-ACNC: 5.62 UIU/ML (ref 0.36–3.74)
TSH SERPL DL<=0.05 MIU/L-ACNC: 5.71 UIU/ML (ref 0.36–3.74)
VLDLC SERPL CALC-MCNC: 14.8 MG/DL

## 2018-02-13 PROCEDURE — 82947 ASSAY GLUCOSE BLOOD QUANT: CPT

## 2018-02-13 PROCEDURE — 84443 ASSAY THYROID STIM HORMONE: CPT

## 2018-02-13 PROCEDURE — 74011250637 HC RX REV CODE- 250/637: Performed by: PSYCHIATRY & NEUROLOGY

## 2018-02-13 PROCEDURE — 80061 LIPID PANEL: CPT

## 2018-02-13 PROCEDURE — 84439 ASSAY OF FREE THYROXINE: CPT | Performed by: PSYCHIATRY & NEUROLOGY

## 2018-02-13 PROCEDURE — 65220000003 HC RM SEMIPRIVATE PSYCH

## 2018-02-13 PROCEDURE — 36415 COLL VENOUS BLD VENIPUNCTURE: CPT

## 2018-02-13 PROCEDURE — 80053 COMPREHEN METABOLIC PANEL: CPT

## 2018-02-13 PROCEDURE — 84443 ASSAY THYROID STIM HORMONE: CPT | Performed by: PSYCHIATRY & NEUROLOGY

## 2018-02-13 RX ORDER — MIRTAZAPINE 15 MG/1
15 TABLET, FILM COATED ORAL
Status: DISCONTINUED | OUTPATIENT
Start: 2018-02-13 | End: 2018-02-14

## 2018-02-13 RX ADMIN — CITALOPRAM HYDROBROMIDE 10 MG: 20 TABLET ORAL at 08:37

## 2018-02-13 RX ADMIN — MIRTAZAPINE 15 MG: 15 TABLET, FILM COATED ORAL at 21:07

## 2018-02-13 NOTE — PROGRESS NOTES
Problem: Falls - Risk of  Goal: *Absence of Falls  Document Mikey Fall Risk and appropriate interventions in the flowsheet. Fall Risk Interventions:Gait is steady . VSS. Wearing slip resistant footwear. No falls reported or observed on this shift. Mentation Interventions: More frequent rounding    Medication Interventions: Teach patient to arise slowly    Elimination Interventions: Toilet paper/wipes in reach             Problem: Suicide/Homicide (Adult/Pediatric)  Goal: *STG: Remains safe in hospital  Outcome: Progressing Towards Goal  Pt denies any suicidal or homicidal thoughts. Contracts for safety. Remains on q 15 min safety checks. Goal: *STG/LTG: Complies with medication therapy  Outcome: Progressing Towards Goal  Pt denies any suicidal or homicidal thoughts. Contracts for safety. Remains on q 15 min safety checks. Problem: Depressed Mood (Adult/Pediatric)  Goal: *STG: Demonstrates reduction in symptoms and increase in insight into coping skills/future focused  Outcome: Progressing Towards Goal  Self reports depression and anxiety slightly better. Has been visible on unit. Encouraged group attendance. Has requested Pastoral care which has been paged.
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
## 2018-02-13 NOTE — PROGRESS NOTES
Problem: Depressed Mood (Adult/Pediatric)  Goal: *STG: Remains safe in hospital  Outcome: Progressing Towards Goal  Patient is alert, calm and verbal.  Participating in group on the General Unit. No distress noted. Patient remains safe in hospital.    TRANSFER - OUT REPORT:    Verbal report given to Yasemin Burks (MARVIN) on [employer]  being transferred to General Unit for routine progression of care       Report consisted of patients Situation, Background, Assessment and   Recommendations(SBAR). Information from the following report(s) SBAR was reviewed with the receiving nurse. Opportunity for questions and clarification was provided.       Patient transported with:   Registered Nurse
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
## 2018-02-13 NOTE — BH NOTES
GROUP THERAPY PROGRESS NOTE    The patient Kimberly Ponce is participating in [facility]. Group time: 30 minutes    Personal goal for participation: to orient the patient to the unit.     Goal orientation: successful adoption of unit rules    Group therapy participation: active    Therapeutic interventions reviewed and discussed: Yes    Impression of participation:     Briana White  2/13/2018 9:54 AM

## 2018-02-13 NOTE — BH NOTES
GROUP THERAPY PROGRESS NOTE    Kimberly Vincent Gonzales is participating in Reflections. Group time: 25 minutes    Personal goal for participation: Unit orientation and daily progress    Goal orientation: personal    Group therapy participation: active    Therapeutic interventions reviewed and discussed: \"Red Flags\" and word search handouts. Impression of participation: Seems in fair spirits. Comments and questions appropriate to group discussion. Adjusting to unit okay.
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
## 2018-02-13 NOTE — PROGRESS NOTES
Problem: Falls - Risk of  Goal: *Absence of Falls  Document Mikey Fall Risk and appropriate interventions in the flowsheet. Outcome: Progressing Towards Goal  Fall Risk Interventions:  Pt is fall free       Mentation Interventions: More frequent rounding    Medication Interventions: Teach patient to arise slowly    Elimination Interventions:  Toileting schedule/hourly rounds

## 2018-02-13 NOTE — BH NOTES
Behavioral Health Interdisciplinary Rounds     Patient Name: Beverley Johnson  Age: 21 y.o. Room/Bed:  736/01  Primary Diagnosis: MDD (major depressive disorder)   Admission Status: TDO     Readmission within 30 days: no  Power of  in place: no  Patient requires a blocked bed: yes          Reason for blocked bed: MRSA    VTE Prophylaxis: Not indicated  Flu vaccine given : no   Mobility needs/Fall risk: no    Nutritional Plan: no  Consults: n/a         Labs/Testing due today?: yes    Sleep hours: 7.15       Participation in Care/Groups:  yes  Medication Compliant?: Yes  PRNS (last 24 hours): None    Restraints (last 24 hours):  no  Substance Abuse:  yes  CIWA (range last 24 hours): 0 COWS (range last 24 hours): 0  Alcohol screening (AUDIT) completed -  AUDIT Score: 8  If applicable, date SBIRT discussed in treatment team AND documented:   AUDIT Screen Score: AUDIT Score: 8      Document Brief Intervention (corresponds directly with the 5 A's, Ask, Advise, Assess, Assist, and Arrange): At- Risk Patients (Score 7-15 for women; 8-15 for men)  Discuss concern patient is drinking at unhealthy levels known to increase risk of alcohol-related health problems. Is Patient ready to commit to change? Yes     If Yes:   Set goal- stop drinking    Plan- refer to IOP   [employer] provided- yes             Tobacco - patient is a smoker: yes   Date tobacco education completed by RN: 2/12/18  24 hour chart check complete: yes     Patient goal(s) for today:   Treatment team focus/goals: Plan for her hearing tomorrow. Plan to transfer to the general unit . Progress note She has been pleasant and compliant with her treatment. She talked about her stressors. She denies SI. She remains depressed.       LOS:  1  Expected LOS: TBD     Financial concerns/prescription coverage:  [employer]   Date of last family contact:       Family requesting physician contact today:    Discharge plan: She is considering returning home with her grandmother.    Guns in the home:  no       Outpatient provider(s): set up with [facility]     Participating treatment team members: Eugenie Diaz -

## 2018-02-14 PROCEDURE — 74011250637 HC RX REV CODE- 250/637: Performed by: PSYCHIATRY & NEUROLOGY

## 2018-02-14 PROCEDURE — 65220000003 HC RM SEMIPRIVATE PSYCH

## 2018-02-14 PROCEDURE — 74011250636 HC RX REV CODE- 250/636

## 2018-02-14 PROCEDURE — 90471 IMMUNIZATION ADMIN: CPT

## 2018-02-14 PROCEDURE — 90686 IIV4 VACC NO PRSV 0.5 ML IM: CPT

## 2018-02-14 RX ORDER — PRAZOSIN HYDROCHLORIDE 1 MG/1
1 CAPSULE ORAL
Status: DISCONTINUED | OUTPATIENT
Start: 2018-02-14 | End: 2018-02-15

## 2018-02-14 RX ORDER — ZOLPIDEM TARTRATE 10 MG/1
10 TABLET ORAL
Status: DISCONTINUED | OUTPATIENT
Start: 2018-02-14 | End: 2018-02-16 | Stop reason: HOSPADM

## 2018-02-14 RX ORDER — CITALOPRAM 20 MG/1
20 TABLET, FILM COATED ORAL DAILY
Status: DISCONTINUED | OUTPATIENT
Start: 2018-02-15 | End: 2018-02-16 | Stop reason: HOSPADM

## 2018-02-14 RX ADMIN — ZOLPIDEM TARTRATE 10 MG: 10 TABLET ORAL at 21:11

## 2018-02-14 RX ADMIN — PRAZOSIN HYDROCHLORIDE 1 MG: 1 CAPSULE ORAL at 21:11

## 2018-02-14 RX ADMIN — CITALOPRAM HYDROBROMIDE 10 MG: 20 TABLET ORAL at 08:56

## 2018-02-14 RX ADMIN — INFLUENZA VIRUS VACCINE 0.5 ML: 15; 15; 15; 15 SUSPENSION INTRAMUSCULAR at 08:58

## 2018-02-14 NOTE — BH NOTES
GROUP THERAPY PROGRESS NOTE    Kimberly Gonzales is participating in Reflections. Group time: 20 minutes    Personal goal for participation: daily progress    Goal orientation: personal    Group therapy participation: passive    Therapeutic interventions reviewed and discussed: yes    Impression of participation: Seems in fair spirits. Adjusting to transfer okay.

## 2018-02-14 NOTE — PROGRESS NOTES
Problem: Falls - Risk of  Goal: *Absence of Falls  Document Mikey Fall Risk and appropriate interventions in the flowsheet. Outcome: Progressing Towards Goal  Fall Risk Interventions:       Mentation Interventions: Adequate sleep, hydration, pain control    Medication Interventions: Teach patient to arise slowly    Elimination Interventions: Toileting schedule/hourly rounds    History of Falls Interventions: Room close to nurse's station    Resting in bed with eyes closed, no complaints, no distress noted. Safety measures in place, will continue to monitor.

## 2018-02-14 NOTE — PROGRESS NOTES
Problem: Depressed Mood (Adult/Pediatric)  Goal: *STG: Participates in treatment plan  Outcome: Progressing Towards Goal  Review meds, out on unit social w peers and staff. Mood and affect smiling and brighter when engaged. Daily goal is to talk with tx team about follow up care. Writing daily goal and completing her WRAP plan handout. TDO hearing outcome is voluntary committed.  Staff focus is on coping skills education

## 2018-02-14 NOTE — PROGRESS NOTES
Problem: Depressed Mood (Adult/Pediatric)  Goal: *STG: Participates in treatment plan  Outcome: Progressing Towards Goal  Patient participated in Reflections Group. Oriented to unit. Voiced no complaints. Visible in DR. Honorio brownlee.

## 2018-02-14 NOTE — BH NOTES
GROUP THERAPY PROGRESS NOTE    Kimberly Becker participated in a Process Group on the General Unit with a focus identifying feelings,   planning for the day, and learning more about DBT concepts of \"Interpersonal Effectiveness and using the   [address] as a long-term personal treatment plan. .  Group time: 70 minutes. Personal goal for participation: To increase the capacity to improve ones mood, set personal goals,   and understand more about basic activities to successfully state and get ones needs met. Goal orientation: The patients will be able to identify their feelings and develop a goal for   themselves for their day. The didactic portion of the session covered two primary topics  one on interpersonal   effectiveness and the second on the [address] as a long-term personal treatment plan. First, they were also asked to review a handout sheet on interpersonal effectiveness and asking   for something necessary. Three main areas of focus were explored in the interactive didactic   session: 1) defining what it is one wants (describe the current situation, express your feelings   and opinions, assert  yourself by asking and being willing to say No. 2) keeping a good   relationship with the person you are asking (be gentle and courteous in ones approach  no   attacks, threats, or judging; listen and be interested in the other person; validate the other   persons feelings and needs; and use an easy manner  be diplomatic and use humor   when you can); and 3) maintaining ones self-respect (be fair to yourself and to the other   person; no apologies for being alive or making a request at all, stick to your values, and be   truthful  dont lie, act helpless, exaggerate, or make excuses).     Second they were asked to consider filling in on their own after group the following elements   of a DBT house drawing with multiple levels:  1) foundation - values that govern your life;   2) first floor with a door  behaviors would like to manage and feel more control over or areas   you want to change; the door represents an opportunity to list or draw things that you keep   hidden from others; 3) second floor  list or draw emotions you want to experience more often,  more fully, or in a more healthy fashion; 4) third floor  a list of things that make you happy or   want to feel happy about; 5) attic  list or draw what  a Life Ximena Melton would look like. There  is also a roof, where people and things that protect you can be listed. The chimney provides an   opportunity to list ways in which you blow off steam. The billboard allows one to post those things   in one's life they are proud of and the walls of the house provide an opportunity to list those people   and things that provide support. The patients were also provided copies of the DBT summary on  interpersonal effectiveness and the [address] that they could complete on their own. Group therapy participation: With prompting, this patient participated in the group. Therapeutic interventions reviewed and discussed: The group members were asked to   identify an emotion they are having and/or let the group know what they want to focus on for the   day as they continue to make discharge plans. The group members took turns reading and   reviewing the summary sheet on Interpersonal Effectiveness and the elements of the [facility]. Impression of participation: The patient said she was feeling \"Good. ..better. \" She said she  anticipated being discharged perhaps as early as the end of this week and that she has come  to realize that she needs to practice \"speaking up for myself\" in a more successful fashion. She said she appreciated the handout for the group today. She was commended for speaking  up for herself and identifying with the struggles voiced be a couple of her peers.  She added that  she is feeling less alone through her interactions in group and in the milieu. Her affect was slightly  less depressed and anxious than she was when she first entered the hospital. She continues  to make good use of group therapy. Her mood reflected her affect. She is showing more capacity  for insight and judgment than on her admission. The patient expressed no current SI/HI and displayed no overt psychotic symptoms in this group.

## 2018-02-14 NOTE — BH NOTES
TRANSFER - IN REPORT:    Verbal report received from Renita Cecilia Peterson Jurgen  on [address]  being received from [address] [place] [address] Unit for routine progression of care      Report consisted of patients Situation, Background, Assessment and   Recommendations(SBAR). Information from the following report(s) SBAR, Kardex and Recent Results was reviewed with the receiving nurse. Opportunity for questions and clarification was provided. Assessment completed upon patients arrival to unit and care assumed.

## 2018-02-14 NOTE — INTERDISCIPLINARY ROUNDS
Behavioral Health Interdisciplinary Rounds     Patient Name: Chirag Tomas  Age: 21 y.o. Room/Bed:  729/02  Primary Diagnosis: MDD (major depressive disorder)   Admission Status: TDO     Readmission within 30 days: no  Power of  in place: no  Patient requires a blocked bed: no          Reason for blocked bed:     VTE Prophylaxis: No  Flu vaccine given : no To be administered prior to discharge  Mobility needs/Fall risk:yes   Nutritional Plan: no  Consults:          Labs/Testing due today?: no    Sleep hours: 6.15       Participation in Care/Groups:  yes  Medication Compliant?: Yes  PRNS (last 24 hours): None    Restraints (last 24 hours):  no  Substance Abuse:  Yes, THC CIWA (range last 24 hours): * COWS (range last 24 hours):   Alcohol screening (AUDIT) completed -  AUDIT Score: 8  If applicable, date SBIRT discussed in treatment team AND documented:   Tobacco - patient is a smoker: yes   Date tobacco education completed by RN: 2/12/18, Needs Reinforcement    24 hour chart check complete: no     Patient goal(s) for today: attend all groups; contact   Treatment team focus/goals: Contact Vinay Buchanan ()  Progress note: Patient reports future-oriented thinking and feeling support from family     LOS:  2  Expected LOS: TBD    Financial concerns/prescription coverage: Pawel Curiel  Date of last family contact: None     Family requesting physician contact today: No  Discharge plan: Considering returning home to her grandmother  Guns in the home: No      Outpatient provider(s): Link to [facility]    Participating treatment team members: Chirag Tomas, Lisa Graves MSW; Dr. Helena Gibson MD; Marjorie Tomas, RN; Hank Pena, PharmD;  Lacy Hamman, Pharmacy student

## 2018-02-15 PROCEDURE — 74011250637 HC RX REV CODE- 250/637: Performed by: PSYCHIATRY & NEUROLOGY

## 2018-02-15 PROCEDURE — 74011250637 HC RX REV CODE- 250/637

## 2018-02-15 PROCEDURE — 65220000003 HC RM SEMIPRIVATE PSYCH

## 2018-02-15 RX ORDER — HYDROXYZINE 25 MG/1
25 TABLET, FILM COATED ORAL
Status: DISCONTINUED | OUTPATIENT
Start: 2018-02-15 | End: 2018-02-16 | Stop reason: HOSPADM

## 2018-02-15 RX ADMIN — CITALOPRAM HYDROBROMIDE 20 MG: 20 TABLET ORAL at 08:51

## 2018-02-15 RX ADMIN — ACETAMINOPHEN 650 MG: 325 TABLET, FILM COATED ORAL at 11:49

## 2018-02-15 RX ADMIN — ZOLPIDEM TARTRATE 10 MG: 10 TABLET ORAL at 21:21

## 2018-02-15 RX ADMIN — HYDROXYZINE HYDROCHLORIDE 25 MG: 25 TABLET ORAL at 14:15
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
## 2018-02-15 NOTE — INTERDISCIPLINARY ROUNDS
Behavioral Health Interdisciplinary Rounds     Patient Name: Nader Johnston  Age: 21 y.o. Room/Bed:  729/  Primary Diagnosis: MDD (major depressive disorder)   Admission Status: Voluntary Commitment     Readmission within 30 days: no  Power of  in place: no  Patient requires a blocked bed: no          Reason for blocked bed:     VTE Prophylaxis: Not indicated  Flu vaccine given : no To be administered prior to discharge  Mobility needs/Fall risk: yes    Nutritional Plan: no  Consults:         Labs/Testing due today?: no    Sleep hours:  6      Participation in Care/Groups:  yes  Medication Compliant?: Yes  PRNS (last 24 hours): Sleep Aid    Restraints (last 24 hours):  no  Substance Abuse:  Yes THC CIWA (range last 24 hours):  COWS (range last 24 hours):   Alcohol screening (AUDIT) completed -  AUDIT Score: 8  If applicable, date SBIRT discussed in treatment team AND documented:  N?A  Tobacco - patient is a smoker: Yes   Date tobacco education completed by RN:  2/12/18, Needs Reinforcement  24 hour chart check complete: yes    Patient goal(s) for today:   Treatment team focus/goals:  Progress note     LOS:  3  Expected LOS:     Financial concerns/prescription coverage:   Date of last family contact:      Family requesting physician contact today:    Discharge plan:   Guns in the home:       Outpatient provider(s):     Participating treatment team members: Nader Johnston, * (assigned SW),

## 2018-02-15 NOTE — PROGRESS NOTES
Participated in [facility] group. Topic for discussion was self-acceptance. [id number] Staff  (Jorje Padilla Patient Care Specialist)   Paging Service [phone])

## 2018-02-15 NOTE — BH NOTES
0330-pt with complaint of feeling dizzy and almost passing out. BP checked 88/50. PO fluids given and BP checked again with results of 109/68. Pt to ring if getting OOB. States feeling much better. Will continue to monitor.

## 2018-02-15 NOTE — BH NOTES
GROUP THERAPY PROGRESS NOTE    Kimberly Ryan participated in a Morning Process Group on the General Unit with a focus on identifying feelings, planning for the day, and learning more about Self-nurturance. Group time: 70 minutes. Personal goal for participation: To identify feelings, increase the capacity to manage ones ability to cope through Wellness in several areas of personal life. Goal orientation: The patient will be able to introduce themselves to their peers, identify their feelings, and consider the importance of coping skill development, particularly in seven areas of ones life. Group therapy participation: With prompting, this patient participated in the group. Therapeutic interventions reviewed and discussed: The patients were encouraged to identify themselves by their first names, speak to their feelings, and define a goal for their day. The group participated in a discussion of a handout that suggested seven areas of one's life that may contribute to self-nurturance: physical nurturing, adult relationships, my voice, creativity, self-compassion, contributions and/or meaning, and limit setting. The group members were provided a copy of the handout to review on their own. Impression of participation: The patient said she was feeling \"great\" and that she hoped she might be discharged as early as tomorrow. She added that [facility]. \" She seemed pleased with her progress and she did not look as depressed or anxious as she was when she first entered the hospital. She expressed no current SI/HI and no overt psychotic symptoms. Her affect was mildly euphoric and anxious. Her mood matched her affect. She indicated that she planned to attend Alcoholics Anonymous (AA) or Narcotics Anonymous (NA) with her boyfriend as part of her aftercare plan.

## 2018-02-15 NOTE — BH NOTES
GROUP THERAPY PROGRESS NOTE    Kimberly Leal is participating in Reflections. Group time: 15 minutes    Personal goal for participation: daily progress    Goal orientation: personal    Group therapy participation: passive    Therapeutic interventions reviewed and discussed: yes    Impression of participation: Seems in fair spirits. Voiced no concerns.

## 2018-02-15 NOTE — BH NOTES
PRN Medication Documentation  [id number]  Specific patient behavior that led to need for PRN medication:c/o anxiety  Staff interventions attempted prior to PRN being given:relaxation  PRN medication given: atarax 25 mg po prn   Patient response/effectiveness of PRN medication: med effective per pt

## 2018-02-15 NOTE — PROGRESS NOTES
Problem: Falls - Risk of  Goal: *Absence of Falls  Document Mikey Fall Risk and appropriate interventions in the flowsheet. Fall Risk Interventions: slip resistant footwear       Mentation Interventions: Adequate sleep, hydration, pain control    Medication Interventions: Teach patient to arise slowly   Pt has remained free from falls. Gait is steady. Problem: Depressed Mood (Adult/Pediatric)  Goal: *STG: Participates in treatment plan  Outcome: Progressing Towards Goal  Mood is bright. She participates in therapeutic activities, and socializes with peers. She verbalizes her needs appropriately.
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
## 2018-02-15 NOTE — PROGRESS NOTES
GROUP THERAPY PROGRESS NOTE      Kimberly Styles was present for medication education group. GROUP TIME: 45 minutes, Thursdays 14:00-14:45    PERSONAL GOAL FOR PARTICIPATION: To be present for group, participate in discussion and answer patient-directed questions. THERAPEUTIC INTERVENTIONS REVIEWED AND DISCUSSED: The following topic was presented: Depression. Signs and symptoms of depression were discussed (in regards to target symptoms for medications). The mechanism of action was discussed for the most common antidepressant classes. Expectations of medications were discussed including when to expect response, potential side effects and ways to prevent/remedy them. Patients were encouraged to advocate for themselves, create healthy therapeutic relationships with providers (particularly once outpatient) and to ask questions when needed. IMPRESSION OF PARTICIPATION: Ms. Ana Pandey attended the entire medication education group. Was respectful to writer and peers. Actively participated and shared personal experiences with antidepressants. She frequently answered questions asked to the group and asked appropriate follow-up questions. Showed great improvement since admission and seems motivated for treatment upon discharge.     Daniela Quezada, PharmD, [facility], [facility], [address] [place] [address]

## 2018-02-16 VITALS
HEIGHT: 62 IN | TEMPERATURE: 98 F | RESPIRATION RATE: 16 BRPM | OXYGEN SATURATION: 98 % | WEIGHT: 100 LBS | BODY MASS INDEX: 18.4 KG/M2 | DIASTOLIC BLOOD PRESSURE: 76 MMHG | HEART RATE: 96 BPM | SYSTOLIC BLOOD PRESSURE: 116 MMHG

## 2018-02-16 PROCEDURE — 74011250637 HC RX REV CODE- 250/637: Performed by: PSYCHIATRY & NEUROLOGY

## 2018-02-16 RX ORDER — CLONIDINE HYDROCHLORIDE 0.1 MG/1
0.1 TABLET ORAL
Status: DISCONTINUED | OUTPATIENT
Start: 2018-02-16 | End: 2018-02-16 | Stop reason: HOSPADM

## 2018-02-16 RX ORDER — HYDROXYZINE 25 MG/1
25 TABLET, FILM COATED ORAL
Qty: 25 TAB | Refills: 1 | Status: SHIPPED | OUTPATIENT
Start: 2018-02-16 | End: 2018-02-26

## 2018-02-16 RX ORDER — ZOLPIDEM TARTRATE 10 MG/1
10 TABLET ORAL
Qty: 15 TAB | Refills: 1 | Status: SHIPPED | OUTPATIENT
Start: 2018-02-16

## 2018-02-16 RX ORDER — CLONIDINE HYDROCHLORIDE 0.1 MG/1
0.1 TABLET ORAL
Qty: 15 TAB | Refills: 1 | Status: SHIPPED | OUTPATIENT
Start: 2018-02-16

## 2018-02-16 RX ORDER — CITALOPRAM 20 MG/1
20 TABLET, FILM COATED ORAL DAILY
Qty: 15 TAB | Refills: 1 | Status: SHIPPED | OUTPATIENT
Start: 2018-02-16

## 2018-02-16 RX ADMIN — HYDROXYZINE HYDROCHLORIDE 25 MG: 25 TABLET ORAL at 09:59

## 2018-02-16 RX ADMIN — CITALOPRAM HYDROBROMIDE 20 MG: 20 TABLET ORAL at 09:59

## 2018-02-16 NOTE — PROGRESS NOTES
GROUP THERAPY PROGRESS NOTE    Kimberly Walshwyatt Leal is participating in Goals Group. Group time: 25 minutes    Personal goal for participation: see dog today, attend an [place] [id number] meeting, call work to tell them she'll be back tomorrow    Goal orientation: personal    Group therapy participation: active    Therapeutic interventions reviewed and discussed: discussed unit schedule, discussed importance of setting goals and importance of setting goals that are small and definable in order to track progress.  Discussed importance of setting fun goals to promote well being along with serious goals    Impression of participation: actively engaged in conversation, provided good examples to group, actively listened

## 2018-02-16 NOTE — PROGRESS NOTES
PRN Medication Documentation    Specific patient behavior that led to need for PRN medication: pt request medication for anxiety not relieved by coping skills. Staff interventions attempted prior to PRN being given: education, coping skills, group participation  PRN medication given: po 25 mg atarax   Patient response/effectiveness of PRN medication: pt appears relaxed, attending groups and activities. Improved concentration.
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
## 2018-02-16 NOTE — BH NOTES
GROUP THERAPY PROGRESS NOTE    Kimberly Copeland participated in a Process Group with a focus identifying feelings, planning for the rest of the day, and reviewing DBT skills for managing emotional distress. Group time: 70 minutes. Personal goal for participation: To increase the capacity to improve ones mood, structure, and coping capacity. Goal orientation: The patient will be able to identify their feelings, develop a plan for structuring their day, and begin to appreciate the possibility of successfully managing distress and other forms of intense emotions. Group therapy participation: With prompting, this patient actively participated in the group. Therapeutic interventions reviewed and discussed: The group members were asked to introduce themselves to each other and to see if they could identify an emotion they are having and/or let the group know what they want to focus on for the day as they continue to make discharge plans. The group members also reviewed five suggested areas of DBT options when experiencing intense emotions: distraction, improve the moment, self-soothe, pros and cons, and radical acceptance. They were asked to take turns reading from the handout and discussing its contents. At the end of group the patients were provided a summary of the topics covered. Impression of participation: The patient said she was feeling \"hopeful\" and that she planned to go to an Alcoholics Anonymous (AA) meeting as soon as she leaves the hospital and to begin looking at [facility] near the [facility]. She also talked about having a temporary sponsor, Yrn Johnston, recommended to her by one of the hospital chaplains, \"We just hit off after just a few minutes of talking. \" She expressed no current SI/HI and displayed no overt psychotic symptoms. \"I'm looking forward to stepping into my new life in recovery,\" she concluded. The patient is not as anxious or depressed as she was on admission.  Her mood was hopeful and future oriented. She seemed pleased to have a plan and an [place] [id number] sponsor.

## 2018-02-16 NOTE — BH NOTES
Behavioral Health Transition Record to Provider    Patient Name: Shruthi Modi  YOB: 2000  Medical Record Number: [id number]  Date of Admission: 2/11/2018  Date of Discharge: 2/16/2018    Attending Provider: Talha Sofia MD  Discharging Provider: Talha Sofia MD  To contact this individual call [phone] and ask the  to page. If unavailable, ask to be transferred to [facility] Provider on call. [facility] Provider will be available on call 24/7 and during holidays. Primary Care Provider: Francisco Conway MD    Allergies   Allergen Reactions    Amoxil [Amoxicillin] Hives       Reason for Admission: Patient was admitted under a temporary jail order (TDO) for severe depression with suicidal ideations proving to be an imminent danger to self and others and an inability to care for self. Admission Diagnosis: MDD (major depressive disorder)    * No surgery found *    Results for orders placed or performed during the hospital encounter of 02/11/18   CULTURE, MRSA   Result Value Ref Range    Special Requests: NO SPECIAL REQUESTS      Culture result: MRSA NOT PRESENT      Culture result:            Screening of patient nares for MRSA is for surveillance purposes and, if positive, to facilitate isolation considerations in high risk settings. It is not intended for automatic decolonization interventions per se as regimens are not sufficiently effective to warrant routine use.    CBC WITH AUTOMATED DIFF   Result Value Ref Range    WBC 7.3 3.6 - 11.0 K/uL    RBC 4.31 3.80 - 5.20 M/uL    HGB 13.9 11.5 - 16.0 g/dL    HCT 40.4 35.0 - 47.0 %    MCV 93.7 80.0 - 99.0 FL    MCH 32.3 26.0 - 34.0 PG    MCHC 34.4 30.0 - 36.5 g/dL    RDW 12.6 11.5 - 14.5 %    PLATELET 627 125 - 713 K/uL    MPV 10.4 8.9 - 12.9 FL    NRBC 0.0 0  WBC    ABSOLUTE NRBC 0.00 0.00 - 0.01 K/uL    NEUTROPHILS 55 32 - 75 %    LYMPHOCYTES 34 12 - 49 %    MONOCYTES 9 5 - 13 %    EOSINOPHILS 2 0 - 7 %    BASOPHILS 0 0 - 1 %    IMMATURE GRANULOCYTES 0 0.0 - 0.5 %    ABS. NEUTROPHILS 4.0 1.8 - 8.0 K/UL    ABS. LYMPHOCYTES 2.5 0.8 - 3.5 K/UL    ABS. MONOCYTES 0.6 0.0 - 1.0 K/UL    ABS. EOSINOPHILS 0.1 0.0 - 0.4 K/UL    ABS. BASOPHILS 0.0 0.0 - 0.1 K/UL    ABS. IMM. GRANS. 0.0 0.00 - 0.04 K/UL    DF AUTOMATED     METABOLIC PANEL, COMPREHENSIVE   Result Value Ref Range    Sodium 137 136 - 145 mmol/L    Potassium 3.3 (L) 3.5 - 5.1 mmol/L    Chloride 103 97 - 108 mmol/L    CO2 29 21 - 32 mmol/L    Anion gap 5 5 - 15 mmol/L    Glucose 81 65 - 100 mg/dL    BUN 15 6 - 20 MG/DL    Creatinine 0.73 0.55 - 1.02 MG/DL    BUN/Creatinine ratio 21 (H) 12 - 20      GFR est AA >60 >60 ml/min/1.73m2    GFR est non-AA >60 >60 ml/min/1.73m2    Calcium 9.1 8.5 - 10.1 MG/DL    Bilirubin, total 0.3 0.2 - 1.0 MG/DL    ALT (SGPT) 22 12 - 78 U/L    AST (SGOT) 19 15 - 37 U/L    Alk.  phosphatase 70 45 - 117 U/L    Protein, total 8.2 6.4 - 8.2 g/dL    Albumin 4.0 3.5 - 5.0 g/dL    Globulin 4.2 (H) 2.0 - 4.0 g/dL    A-G Ratio 1.0 (L) 1.1 - 2.2     ACETAMINOPHEN   Result Value Ref Range    Acetaminophen level <2 (L) 10 - 30 ug/mL   DRUG SCREEN, URINE   Result Value Ref Range    AMPHETAMINES NEGATIVE  NEG      BARBITURATES NEGATIVE  NEG      BENZODIAZEPINES POSITIVE (A) NEG      COCAINE NEGATIVE  NEG      METHADONE NEGATIVE  NEG      OPIATES NEGATIVE  NEG      PCP(PHENCYCLIDINE) NEGATIVE  NEG      THC (TH-CANNABINOL) POSITIVE (A) NEG      Drug screen comment (NOTE)    ETHYL ALCOHOL   Result Value Ref Range    ALCOHOL(ETHYL),SERUM <10 <10 MG/DL   URINALYSIS W/MICROSCOPIC   Result Value Ref Range    Color YELLOW/STRAW      Appearance CLEAR CLEAR      Specific gravity 1.008 1.003 - 1.030      pH (UA) 6.5 5.0 - 8.0      Protein NEGATIVE  NEG mg/dL    Glucose NEGATIVE  NEG mg/dL    Ketone NEGATIVE  NEG mg/dL    Bilirubin NEGATIVE  NEG      Blood NEGATIVE  NEG      Urobilinogen 0.2 0.2 - 1.0 EU/dL    Nitrites NEGATIVE  NEG      Leukocyte Esterase NEGATIVE  NEG      WBC 0-4 0 - 4 /hpf    RBC 0-5 0 - 5 /hpf    Epithelial cells FEW FEW /lpf    Bacteria NEGATIVE  NEG /hpf    Hyaline cast 0-2 0 - 5 /lpf   SALICYLATE   Result Value Ref Range    Salicylate level <6.3 (L) 2.8 - 18.7 MG/DL   METABOLIC PANEL, COMPREHENSIVE   Result Value Ref Range    Sodium 137 136 - 145 mmol/L    Potassium 4.7 3.5 - 5.1 mmol/L    Chloride 104 97 - 108 mmol/L    CO2 27 21 - 32 mmol/L    Anion gap 6 5 - 15 mmol/L    Glucose 82 65 - 100 mg/dL    BUN 13 6 - 20 MG/DL    Creatinine 0.81 0.55 - 1.02 MG/DL    BUN/Creatinine ratio 16 12 - 20      GFR est AA >60 >60 ml/min/1.73m2    GFR est non-AA >60 >60 ml/min/1.73m2    Calcium 9.4 8.5 - 10.1 MG/DL    Bilirubin, total 0.6 0.2 - 1.0 MG/DL    ALT (SGPT) 23 12 - 78 U/L    AST (SGOT) 25 15 - 37 U/L    Alk.  phosphatase 78 45 - 117 U/L    Protein, total 8.9 (H) 6.4 - 8.2 g/dL    Albumin 4.5 3.5 - 5.0 g/dL    Globulin 4.4 (H) 2.0 - 4.0 g/dL    A-G Ratio 1.0 (L) 1.1 - 2.2     GLUCOSE, FASTING   Result Value Ref Range    Glucose 82 65 - 100 MG/DL   TSH 3RD GENERATION   Result Value Ref Range    TSH 5.62 (H) 0.36 - 3.74 uIU/mL   LIPID PANEL   Result Value Ref Range    LIPID PROFILE          Cholesterol, total 238 (H) <200 MG/DL    Triglyceride 74 <150 MG/DL    HDL Cholesterol 90 MG/DL    LDL, calculated 133.2 (H) 0 - 100 MG/DL    VLDL, calculated 14.8 MG/DL    CHOL/HDL Ratio 2.6 0 - 5.0     T4, FREE   Result Value Ref Range    T4, Free 1.0 0.8 - 1.5 NG/DL   TSH 3RD GENERATION   Result Value Ref Range    TSH 5.71 (H) 0.36 - 3.74 uIU/mL   HCG URINE, QL. - POC   Result Value Ref Range    Pregnancy test,urine (POC) NEGATIVE  NEG     EKG, 12 LEAD, INITIAL   Result Value Ref Range    Ventricular Rate 75 BPM    Atrial Rate 75 BPM    P-R Interval 152 ms    QRS Duration 72 ms    Q-T Interval 396 ms    QTC Calculation (Bezet) 442 ms    Calculated P Axis 66 degrees    Calculated R Axis 75 degrees    Calculated T Axis 32 degrees    Diagnosis       Normal sinus rhythm  No previous ECGs available  Confirmed by Leigh Zeng ([id number]) on 2018 10:10:45 AM         Immunizations administered during this encounter:   Immunization History   Administered Date(s) Administered    Influenza Vaccine (Quad) PF 2018       Screening for Metabolic Disorders for Patients on Antipsychotic Medications  (Data obtained from the EMR)    Estimated Body Mass Index  Estimated body mass index is 18.29 kg/(m^2) as calculated from the following:    Height as of this encounter: 5' 2\" (1.575 m). Weight as of this encounter: 45.4 kg (100 lb). Vital Signs/Blood Pressure  Visit Vitals    /76    Pulse 96    Temp 98 °F (36.7 °C)    Resp 16    Ht 5' 2\" (1.575 m)    Wt 45.4 kg (100 lb)    LMP 2018    SpO2 98%    BMI 18.29 kg/m2       Blood Glucose/Hemoglobin A1c  Lab Results   Component Value Date/Time    Glucose 82 2018 06:05 AM    Glucose 82 2018 06:05 AM       No results found for: HBA1C, HGBE8, NLF3GWKV     Lipid Panel  Lab Results   Component Value Date/Time    Cholesterol, total 238 (H) 2018 06:05 AM    HDL Cholesterol 90 2018 06:05 AM    LDL, calculated 133.2 (H) 2018 06:05 AM    Triglyceride 74 2018 06:05 AM    CHOL/HDL Ratio 2.6 2018 06:05 AM        Discharge Diagnosis: Major depressive disorder (ICD-10-CM: F32.9)    Discharge Plan: Patient discharged home into the care of her family. DISCHARGE SUMMARY    NAME:Kimberly Bojorquez  : 2000  MRN: [id number]    The patient Lyman Nao exhibits the ability to control behavior in a less restrictive environment. Patient's level of functioning is improving. No assaultive/destructive behavior has been observed for the past 24 hours. No suicidal/homicidal threat or behavior has been observed for the past 24 hours. There is no evidence of serious medication side effects. Patient has not been in physical or protective restraints for at least the past 24 hours.     If weapons involved, how are they secured? No weapons involved. Is patient aware of and in agreement with discharge plan? Yes    Arrangements for medication:  Prescriptions given to patient. Referral for substance abuse treatment? Yes, Radha Hall    Referral for smoking cessation needed? Yes, refused    Copy of discharge instructions to provider?:  Radha Hall ([phone]); Latrell Chisholm ([phone])    Arrangements for transportation home:  Family to . Keep all follow up appointments as scheduled, continue to take prescribed medications per physician instructions. Mental health crisis number:  [phone] or your local mental health crisis line number at [phone]. Discharge Medication List and Instructions:   Discharge Medication List as of 2/16/2018 12:01 PM      START taking these medications    Details   citalopram (CELEXA) 20 mg tablet Take 1 Tab by mouth daily. Indications: Generalized Anxiety Disorder, major depressive disorder, Print, Disp-15 Tab, R-1      zolpidem (AMBIEN) 10 mg tablet Take 1 Tab by mouth nightly as needed for Sleep. Max Daily Amount: 10 mg. Indications: SLEEP-ONSET INSOMNIA, Print, Disp-15 Tab, R-1      cloNIDine HCl (CATAPRES) 0.1 mg tablet Take 1 Tab by mouth nightly. Indications: ptsd, insomnia, Print, Disp-15 Tab, R-1      hydrOXYzine HCl (ATARAX) 25 mg tablet Take 1 Tab by mouth every six (6) hours as needed (anxiety) for up to 10 days. Indications: anxiety, Print, Disp-25 Tab, R-1         CONTINUE these medications which have NOT CHANGED    Details   norethindrone-ethinyl estradiol (MICROGESTIN FE 1/20, 28,) 1 mg-20 mcg (21)/75 mg (7) tab Take 1 Tab by mouth daily. Indications: Pregnancy Contraception, Historical Med             [employer]     None        To obtain results of studies pending at discharge, please contact [phone].     Follow-up Information     Follow up With Details Comments Contact Info    Ciarra Noel On 3/14/2018 You have a 10:30am appointment with the psychiatric nurse practitioner for medication management. [facility]  [id number]  [place], [address]  [phone]    Chaparro Farias On 2/28/2018 You have a 3:00pm appointment for therapy. [facility]   [address], [address] [place] [address]  [phone]    [facility] Call Please call to register. Individuals will learn to have greater distress tolerance, improved interpersonal effectiveness, increased emotional regulation, and the ability to be \"mindful\" rather than \"mind full. \" [facility]. [address]  [place], [address] [place] [address]  [phone]    Ankur Harp MD   [address] [place] [address]  [phone]            Advanced Directive:   Does the patient have an appointed surrogate decision maker? No  Does the patient have a Medical Advance Directive? No  Does the patient have a Psychiatric Advance Directive? No  If the patient does not have a surrogate or Medical Advance Directive AND Psychiatric Advance Directive, the patient was offered information on these advance directives Yes and Patient declined to complete    Patient Instructions: Please continue all medications until otherwise directed by physician. What to do at Home:  Recommended activity: Activity as tolerated,     If you experience any of the following symptoms thoughts of harming self, feeling overwhelmed with anxiety, hopelessness or worthlessness, please follow up with your assigned providers and local crisis number. *  Please give a list of your current medications to your Primary Care Provider. *  Please update this list whenever your medications are discontinued, doses are      changed, or new medications (including over-the-counter products) are added. *  Please carry medication information at all times in case of emergency situations.     Tobacco Cessation Discharge Plan:   Is the patient a smoker and needs referral for smoking cessation? Yes  Patient referred to the following for smoking cessation with an appointment? Refused     Patient was offered medication to assist with smoking cessation at discharge? Refused  Was education for smoking cessation added to the discharge instructions? Yes    Alcohol/Substance Abuse Discharge Plan:   Does the patient have a history of substance/alcohol abuse and requires a referral for treatment? Yes  Patient referred to the following for substance/alcohol abuse treatment with an appointment? Yes, Patient has an appointment with Magali Mayer on 2/28/18 at 3:00pm.  Patient was offered medication to assist with alcohol cessation at discharge? No  Was education for substance/alcohol abuse added to discharge instructions? Yes    Patient discharged to Home; discussed with patient/caregiver and provided to the patient/caregiver either in hard copy or electronically.

## 2018-02-16 NOTE — INTERDISCIPLINARY ROUNDS
Behavioral Health Interdisciplinary Rounds     Patient Name: Chandrika Lewis  Age: 21 y.o. Room/Bed:  729/02  Primary Diagnosis: MDD (major depressive disorder)   Admission Status: Voluntary Commitment     Readmission within 30 days: no  Power of  in place: no  Patient requires a blocked bed: no          Reason for blocked bed:     VTE Prophylaxis: Not indicated  Flu vaccine given : no To be administered prior to discharge  Mobility needs/Fall risk: yes    Nutritional Plan: no  Consults:          Labs/Testing due today?: no    Sleep hours: 6.15       Participation in Care/Groups:  yes  Medication Compliant?: Yes  PRNS (last 24 hours): Sleep Aid, antianxiety, pain    Restraints (last 24 hours):  no  Substance Abuse:  Yes THC  CIWA (range last 24 hours):  COWS (range last 24 hours):   Alcohol screening (AUDIT) completed -  AUDIT Score: 8  If applicable, date SBIRT discussed in treatment team AND documented: 2/13/18  Tobacco - patient is a smoker: yes   Date tobacco education completed by RN:  2/12/18, Needs Reinforcement  24 hour chart check complete: yes     Patient goal(s) for today: Discharge  Treatment team focus/goals: Link to therapist for follow-up;  Discharge  Progress note: Patient reports she is excited and ready for discharge    LOS:  4  Expected LOS: 4    Financial concerns/prescription coverage: [employer]  Date of last family contact:     Family requesting physician contact today: No  Discharge plan: Return home  Guns in the home: No       Outpatient provider(s): Serenity Gonsales    Participating treatment team members: Yessy Aguilar MSW; Dr. Quinton Bryant MD; Annabel Lisa RN; [facility] medicine resident

## 2018-02-16 NOTE — PROGRESS NOTES
Problem: Depressed Mood (Adult/Pediatric)  Goal: *STG: Participates in treatment plan  Outcome: Progressing Towards Goal  Review meds, out on unit social w peers and staff. Mood and affect smiling and brighter. Denies SI, no  Self harming behaviors. Daily goal is to d/c home.  Staff focus is on d/c planning

## 2018-02-16 NOTE — PROGRESS NOTES
Problem: Depressed Mood (Adult/Pediatric)  Goal: *STG: Participates in treatment plan  Outcome: Progressing Towards Goal  Pt in involves herself in recreational activities. Mood is congruent to situation.

## 2018-02-16 NOTE — DISCHARGE INSTRUCTIONS
DISCHARGE SUMMARY    NAME:Kimberly Bojorquez  : 2000  MRN: [id number]    The patient Alisson Abreu exhibits the ability to control behavior in a less restrictive environment. Patient's level of functioning is improving. No assaultive/destructive behavior has been observed for the past 24 hours. No suicidal/homicidal threat or behavior has been observed for the past 24 hours. There is no evidence of serious medication side effects. Patient has not been in physical or protective restraints for at least the past 24 hours. If weapons involved, how are they secured? No weapons involved. Is patient aware of and in agreement with discharge plan? Yes    Arrangements for medication:  Prescriptions given to patient. Referral for substance abuse treatment? Yes, Yaritza Nair    Referral for smoking cessation needed? Yes, refused    Copy of discharge instructions to provider?:  Yaritza Nair ([phone]); Kath Kwon ([phone])    Arrangements for transportation home:  Family to . Keep all follow up appointments as scheduled, continue to take prescribed medications per physician instructions. Mental health crisis number:  088 or your local mental health crisis line number at [phone]. DISCHARGE SUMMARY from Nurse    PATIENT INSTRUCTIONS:    What to do at Home:  Recommended activity: Activity as tolerated,     If you experience any of the following symptoms thoughts of harming self, feeling overwhelmed with anxiety, hopelessness or worthlessness, please follow up with your assigned providers and local crisis number. *  Please give a list of your current medications to your Primary Care Provider. *  Please update this list whenever your medications are discontinued, doses are      changed, or new medications (including over-the-counter products) are added. *  Please carry medication information at all times in case of emergency situations.     These are general instructions for a healthy lifestyle:    No smoking/ No tobacco products/ Avoid exposure to second hand smoke  Surgeon General's Warning:  Quitting smoking now greatly reduces serious risk to your health. Obesity, smoking, and sedentary lifestyle greatly increases your risk for illness    A healthy diet, regular physical exercise & weight monitoring are important for maintaining a healthy lifestyle    You may be retaining fluid if you have a history of heart failure or if you experience any of the following symptoms:  Weight gain of 3 pounds or more overnight or 5 pounds in a week, increased swelling in our hands or feet or shortness of breath while lying flat in bed. Please call your doctor as soon as you notice any of these symptoms; do not wait until your next office visit. Recognize signs and symptoms of STROKE:    F-face looks uneven    A-arms unable to move or move unevenly    S-speech slurred or non-existent    T-time-call 911 as soon as signs and symptoms begin-DO NOT go       Back to bed or wait to see if you get better-TIME IS BRAIN. Warning Signs of HEART ATTACK     Call 911 if you have these symptoms:   Chest discomfort. Most heart attacks involve discomfort in the center of the chest that lasts more than a few minutes, or that goes away and comes back. It can feel like uncomfortable pressure, squeezing, fullness, or pain.  Discomfort in other areas of the upper body. Symptoms can include pain or discomfort in one or both arms, the back, neck, jaw, or stomach.  Shortness of breath with or without chest discomfort.  Other signs may include breaking out in a cold sweat, nausea, or lightheadedness. Don't wait more than five minutes to call 911 - MINUTES MATTER! Fast action can save your life. Calling 911 is almost always the fastest way to get lifesaving treatment.  Emergency Medical Services staff can begin treatment when they arrive -- up to an hour sooner than if someone gets to the [facility] by car. The discharge information has been reviewed with the patient. The patient verbalized understanding. Discharge medications reviewed with the patient and appropriate educational materials and side effects teaching were provided.   ___________________________________________________________________________________________________________________________________

## 2018-02-16 NOTE — PROGRESS NOTES
Problem: Falls - Risk of  Goal: *Absence of Falls  Document Mikey Fall Risk and appropriate interventions in the flowsheet. Outcome: Progressing Towards Goal  Fall Risk Interventions:       Mentation Interventions: Adequate sleep, hydration, pain control    Medication Interventions: Teach patient to arise slowly    Elimination Interventions: Bed/chair exit alarm    History of Falls Interventions: Bed/chair exit alarm, Room close to nurse's station    Resting in bed with eyes closed, no complaints, no distress noted. Safety measures in place, will continue to monitor.

## 2018-06-05 ENCOUNTER — HOSPITAL ENCOUNTER (EMERGENCY)
Age: 18
Discharge: HOME | End: 2018-06-05
Attending: EMERGENCY MEDICINE | Admitting: EMERGENCY MEDICINE
Payer: COMMERCIAL

## 2018-06-05 VITALS
TEMPERATURE: 96.8 F | HEART RATE: 77 BPM | WEIGHT: 99.21 LBS | DIASTOLIC BLOOD PRESSURE: 64 MMHG | OXYGEN SATURATION: 100 % | RESPIRATION RATE: 15 BRPM | BODY MASS INDEX: 18.15 KG/M2 | SYSTOLIC BLOOD PRESSURE: 123 MMHG

## 2018-06-05 DIAGNOSIS — R11.2 NON-INTRACTABLE VOMITING WITH NAUSEA, UNSPECIFIED VOMITING TYPE: Primary | ICD-10-CM

## 2018-06-05 LAB
ALBUMIN SERPL-MCNC: 4.7 G/DL (ref 3.5–5)
ALBUMIN/GLOB SERPL: 1.3 {RATIO} (ref 1.1–2.2)
ALP SERPL-CCNC: 75 U/L (ref 45–117)
ALT SERPL-CCNC: 17 U/L (ref 12–78)
AMORPH CRY URNS QL MICRO: ABNORMAL
ANION GAP SERPL CALC-SCNC: 14 MMOL/L (ref 5–15)
APPEARANCE UR: ABNORMAL
AST SERPL-CCNC: 19 U/L (ref 15–37)
BACTERIA URNS QL MICRO: NEGATIVE /HPF
BASOPHILS # BLD: 0 K/UL (ref 0–0.1)
BASOPHILS NFR BLD: 0 % (ref 0–1)
BILIRUB SERPL-MCNC: 0.5 MG/DL (ref 0.2–1)
BILIRUB UR QL: NEGATIVE
BUN SERPL-MCNC: 9 MG/DL (ref 6–20)
BUN/CREAT SERPL: 11 (ref 12–20)
CALCIUM SERPL-MCNC: 9.2 MG/DL (ref 8.5–10.1)
CHLORIDE SERPL-SCNC: 106 MMOL/L (ref 97–108)
CO2 SERPL-SCNC: 20 MMOL/L (ref 21–32)
COLOR UR: ABNORMAL
CREAT SERPL-MCNC: 0.83 MG/DL (ref 0.55–1.02)
DIFFERENTIAL METHOD BLD: ABNORMAL
EOSINOPHIL # BLD: 0 K/UL (ref 0–0.4)
EOSINOPHIL NFR BLD: 0 % (ref 0–7)
EPITH CASTS URNS QL MICRO: ABNORMAL /LPF
ERYTHROCYTE [DISTWIDTH] IN BLOOD BY AUTOMATED COUNT: 12.7 % (ref 11.5–14.5)
ETHANOL SERPL-MCNC: <10 MG/DL
GLOBULIN SER CALC-MCNC: 3.7 G/DL (ref 2–4)
GLUCOSE SERPL-MCNC: 172 MG/DL (ref 65–100)
GLUCOSE UR STRIP.AUTO-MCNC: 100 MG/DL
HCT VFR BLD AUTO: 42.9 % (ref 35–47)
HGB BLD-MCNC: 14.6 G/DL (ref 11.5–16)
HGB UR QL STRIP: NEGATIVE
IMM GRANULOCYTES # BLD: 0.1 K/UL (ref 0–0.04)
IMM GRANULOCYTES NFR BLD AUTO: 1 % (ref 0–0.5)
KETONES UR QL STRIP.AUTO: 80 MG/DL
LEUKOCYTE ESTERASE UR QL STRIP.AUTO: ABNORMAL
LYMPHOCYTES # BLD: 1.5 K/UL (ref 0.8–3.5)
LYMPHOCYTES NFR BLD: 13 % (ref 12–49)
MCH RBC QN AUTO: 31.1 PG (ref 26–34)
MCHC RBC AUTO-ENTMCNC: 34 G/DL (ref 30–36.5)
MCV RBC AUTO: 91.5 FL (ref 80–99)
MONOCYTES # BLD: 0.6 K/UL (ref 0–1)
MONOCYTES NFR BLD: 5 % (ref 5–13)
NEUTS SEG # BLD: 9.1 K/UL (ref 1.8–8)
NEUTS SEG NFR BLD: 81 % (ref 32–75)
NITRITE UR QL STRIP.AUTO: NEGATIVE
NRBC # BLD: 0 K/UL (ref 0–0.01)
NRBC BLD-RTO: 0 PER 100 WBC
PH UR STRIP: 8 [PH] (ref 5–8)
PLATELET # BLD AUTO: 278 K/UL (ref 150–400)
PMV BLD AUTO: 10.7 FL (ref 8.9–12.9)
POTASSIUM SERPL-SCNC: 3.1 MMOL/L (ref 3.5–5.1)
PROT SERPL-MCNC: 8.4 G/DL (ref 6.4–8.2)
PROT UR STRIP-MCNC: 30 MG/DL
RBC # BLD AUTO: 4.69 M/UL (ref 3.8–5.2)
RBC #/AREA URNS HPF: ABNORMAL /HPF (ref 0–5)
SODIUM SERPL-SCNC: 140 MMOL/L (ref 136–145)
SP GR UR REFRACTOMETRY: 1.02 (ref 1–1.03)
UR CULT HOLD, URHOLD: NORMAL
UROBILINOGEN UR QL STRIP.AUTO: 0.2 EU/DL (ref 0.2–1)
WBC # BLD AUTO: 11.3 K/UL (ref 3.6–11)
WBC URNS QL MICRO: ABNORMAL /HPF (ref 0–4)

## 2018-06-05 PROCEDURE — 96374 THER/PROPH/DIAG INJ IV PUSH: CPT

## 2018-06-05 PROCEDURE — 80053 COMPREHEN METABOLIC PANEL: CPT | Performed by: NURSE PRACTITIONER

## 2018-06-05 PROCEDURE — 81001 URINALYSIS AUTO W/SCOPE: CPT | Performed by: NURSE PRACTITIONER

## 2018-06-05 PROCEDURE — 85025 COMPLETE CBC W/AUTO DIFF WBC: CPT | Performed by: NURSE PRACTITIONER

## 2018-06-05 PROCEDURE — 36415 COLL VENOUS BLD VENIPUNCTURE: CPT | Performed by: NURSE PRACTITIONER

## 2018-06-05 PROCEDURE — 80307 DRUG TEST PRSMV CHEM ANLYZR: CPT | Performed by: NURSE PRACTITIONER

## 2018-06-05 PROCEDURE — 74011250636 HC RX REV CODE- 250/636: Performed by: NURSE PRACTITIONER

## 2018-06-05 PROCEDURE — 96361 HYDRATE IV INFUSION ADD-ON: CPT

## 2018-06-05 PROCEDURE — 87086 URINE CULTURE/COLONY COUNT: CPT | Performed by: NURSE PRACTITIONER

## 2018-06-05 PROCEDURE — 99284 EMERGENCY DEPT VISIT MOD MDM: CPT

## 2018-06-05 RX ORDER — ONDANSETRON 2 MG/ML
4 INJECTION INTRAMUSCULAR; INTRAVENOUS
Status: COMPLETED | OUTPATIENT
Start: 2018-06-05 | End: 2018-06-05

## 2018-06-05 RX ORDER — SERTRALINE HYDROCHLORIDE 100 MG/1
TABLET, FILM COATED ORAL DAILY
COMMUNITY

## 2018-06-05 RX ORDER — NORETHINDRONE ACETATE AND ETHINYL ESTRADIOL 1; .02 MG/1; MG/1
TABLET ORAL
COMMUNITY

## 2018-06-05 RX ADMIN — ONDANSETRON 4 MG: 2 INJECTION INTRAMUSCULAR; INTRAVENOUS at 14:54

## 2018-06-05 RX ADMIN — SODIUM CHLORIDE 1000 ML: 900 INJECTION, SOLUTION INTRAVENOUS at 14:50
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
## 2018-06-05 NOTE — ED TRIAGE NOTES
Triage note: Patient drank two bottle of white wine last night, woke this morning with nausea and vomiting.

## 2018-06-05 NOTE — ED NOTES
Assumed care of patient from [facility]. Patient resting comfortably in no apparent distress. Call bell within reach. Plan of care discussed.

## 2018-06-05 NOTE — DISCHARGE INSTRUCTIONS
Nausea and Vomiting: Care Instructions  Your Care Instructions    When you are nauseated, you may feel weak and sweaty and notice a lot of saliva in your mouth. Nausea often leads to vomiting. Most of the time you do not need to worry about nausea and vomiting, but they can be signs of other illnesses. Two common causes of nausea and vomiting are stomach flu and food poisoning. Nausea and vomiting from viral stomach flu will usually start to improve within 24 hours. Nausea and vomiting from food poisoning may last from 12 to 48 hours. The doctor has checked you carefully, but problems can develop later. If you notice any problems or new symptoms, get medical treatment right away. Follow-up care is a key part of your treatment and safety. Be sure to make and go to all appointments, and call your doctor if you are having problems. It's also a good idea to know your test results and keep a list of the medicines you take. How can you care for yourself at home? · To prevent dehydration, drink plenty of fluids, enough so that your urine is light yellow or clear like water. Choose water and other caffeine-free clear liquids until you feel better. If you have kidney, heart, or liver disease and have to limit fluids, talk with your doctor before you increase the amount of fluids you drink. · Rest in bed until you feel better. · When you are able to eat, try clear soups, mild foods, and liquids until all symptoms are gone for 12 to 48 hours. Other good choices include dry toast, crackers, cooked cereal, and gelatin dessert, such as Jell-O. When should you call for help? Call 911 anytime you think you may need emergency care. For example, call if:  ? · You passed out (lost consciousness). ?Call your doctor now or seek immediate medical care if:  ? · You have symptoms of dehydration, such as:  ¨ Dry eyes and a dry mouth. ¨ Passing only a little dark urine.   ¨ Feeling thirstier than usual.   ? · You have new or worsening belly pain. ? · You have a new or higher fever. ? · You vomit blood or what looks like coffee grounds. ? Watch closely for changes in your health, and be sure to contact your doctor if:  ? · You have ongoing nausea and vomiting. ? · Your vomiting is getting worse. ? · Your vomiting lasts longer than 2 days. ? · You are not getting better as expected. Where can you learn more? Go to http://sarah-sean.info/. Enter 25 [id number] in the search box to learn more about \"Nausea and Vomiting: Care Instructions. \"  Current as of: March 20, 2017  Content Version: 11.4  © 8936-4990 [employer]. Care instructions adapted under license by [employer] (which disclaims liability or warranty for this information). If you have questions about a medical condition or this instruction, always ask your healthcare professional. [place] [address] any warranty or liability for your use of this information. We hope that we have addressed all of your medical concerns. The examination and treatment you received in the Emergency Department were for an emergent problem and were not intended as complete care. It is important that you follow up with your healthcare provider(s) for ongoing care. If your symptoms worsen or do not improve as expected, and you are unable to reach your usual health care provider(s), you should return to the Emergency Department. Today's healthcare is undergoing tremendous change, and patient satisfaction surveys are one of the many tools to assess the quality of medical care. You may receive a survey from the [employer] organization regarding your experience in the Emergency Department. I hope that your experience has been completely positive, particularly the medical care that I provided. As such, please participate in the survey; anything less than excellent does not meet my expectations or intentions.         [facility] and Lydia Seaman participate in nationally recognized quality of care measures. If your blood pressure is greater than 120/80, as reported below, we urge that you seek medical care to address the potential of high blood pressure, commonly known as hypertension. Hypertension can be hereditary or can be caused by certain medical conditions, pain, stress, or \"white coat syndrome. \"       Please make an appointment with your health care provider(s) for follow up of your Emergency Department visit. VITALS:   Patient Vitals for the past 8 hrs:   Temp Pulse Resp BP SpO2   06/05/18 1433 96.8 °F (36 °C) 74 22 140/88 100 %          Thank you for allowing us to provide you with medical care today. We realize that you have many choices for your emergency care needs. Please choose us in the future for any continued health care needs. Marisa Masterson, ASHLEY    [address] [facility].   Office: [phone]            Recent Results (from the past 24 hour(s))   ETHYL ALCOHOL    Collection Time: 06/05/18  2:49 PM   Result Value Ref Range    ALCOHOL(ETHYL),SERUM <10 <10 MG/DL   CBC WITH AUTOMATED DIFF    Collection Time: 06/05/18  2:49 PM   Result Value Ref Range    WBC 11.3 (H) 3.6 - 11.0 K/uL    RBC 4.69 3.80 - 5.20 M/uL    HGB 14.6 11.5 - 16.0 g/dL    HCT 42.9 35.0 - 47.0 %    MCV 91.5 80.0 - 99.0 FL    MCH 31.1 26.0 - 34.0 PG    MCHC 34.0 30.0 - 36.5 g/dL    RDW 12.7 11.5 - 14.5 %    PLATELET 120 341 - 259 K/uL    MPV 10.7 8.9 - 12.9 FL    NRBC 0.0 0  WBC    ABSOLUTE NRBC 0.00 0.00 - 0.01 K/uL    NEUTROPHILS 81 (H) 32 - 75 %    LYMPHOCYTES 13 12 - 49 %    MONOCYTES 5 5 - 13 %    EOSINOPHILS 0 0 - 7 %    BASOPHILS 0 0 - 1 %    IMMATURE GRANULOCYTES 1 (H) 0.0 - 0.5 %    ABS. NEUTROPHILS 9.1 (H) 1.8 - 8.0 K/UL    ABS. LYMPHOCYTES 1.5 0.8 - 3.5 K/UL    ABS. MONOCYTES 0.6 0.0 - 1.0 K/UL    ABS. EOSINOPHILS 0.0 0.0 - 0.4 K/UL    ABS.  BASOPHILS 0.0 0.0 - 0.1 K/UL    ABS. IMM. GRANS. 0.1 (H) 0.00 - 0.04 K/UL    DF AUTOMATED     METABOLIC PANEL, COMPREHENSIVE    Collection Time: 06/05/18  2:49 PM   Result Value Ref Range    Sodium 140 136 - 145 mmol/L    Potassium 3.1 (L) 3.5 - 5.1 mmol/L    Chloride 106 97 - 108 mmol/L    CO2 20 (L) 21 - 32 mmol/L    Anion gap 14 5 - 15 mmol/L    Glucose 172 (H) 65 - 100 mg/dL    BUN 9 6 - 20 MG/DL    Creatinine 0.83 0.55 - 1.02 MG/DL    BUN/Creatinine ratio 11 (L) 12 - 20      GFR est AA >60 >60 ml/min/1.73m2    GFR est non-AA >60 >60 ml/min/1.73m2    Calcium 9.2 8.5 - 10.1 MG/DL    Bilirubin, total 0.5 0.2 - 1.0 MG/DL    ALT (SGPT) 17 12 - 78 U/L    AST (SGOT) 19 15 - 37 U/L    Alk. phosphatase 75 45 - 117 U/L    Protein, total 8.4 (H) 6.4 - 8.2 g/dL    Albumin 4.7 3.5 - 5.0 g/dL    Globulin 3.7 2.0 - 4.0 g/dL    A-G Ratio 1.3 1.1 - 2.2     URINALYSIS W/MICROSCOPIC    Collection Time: 06/05/18  4:14 PM   Result Value Ref Range    Color YELLOW/STRAW      Appearance TURBID (A) CLEAR      Specific gravity 1.025 1.003 - 1.030      pH (UA) 8.0 5.0 - 8.0      Protein 30 (A) NEG mg/dL    Glucose 100 (A) NEG mg/dL    Ketone 80 (A) NEG mg/dL    Bilirubin NEGATIVE  NEG      Blood NEGATIVE  NEG      Urobilinogen 0.2 0.2 - 1.0 EU/dL    Nitrites NEGATIVE  NEG      Leukocyte Esterase TRACE (A) NEG      WBC PENDING /hpf    RBC PENDING /hpf    Epithelial cells PENDING /lpf    Bacteria PENDING /hpf   URINE CULTURE HOLD SAMPLE    Collection Time: 06/05/18  4:14 PM   Result Value Ref Range    Urine culture hold        URINE ON HOLD IN MICROBIOLOGY DEPT FOR 3 DAYS. IF UNPRESERVED URINE IS SUBMITTED, IT CANNOT BE USED FOR ADDITIONAL TESTING AFTER 24 HRS, RECOLLECTION WILL BE REQUIRED. No results found.

## 2018-06-05 NOTE — ED NOTES
Patient arrived via EMS, walked from stretcher to bed. Vomiting clear liquid. Abdomen soft but non tender to palpation. Patient stating she drinks 3-4 times a week, last night drank two bottles of white wine. PIV established. IVF infusing without difficulty. [place] at bedside.

## 2018-06-06 LAB
BACTERIA SPEC CULT: NORMAL
CC UR VC: NORMAL
SERVICE CMNT-IMP: NORMAL

## 2018-09-18 ENCOUNTER — APPOINTMENT (OUTPATIENT)
Dept: ULTRASOUND IMAGING | Age: 18
End: 2018-09-18
Attending: EMERGENCY MEDICINE
Payer: COMMERCIAL

## 2018-09-18 ENCOUNTER — APPOINTMENT (OUTPATIENT)
Dept: CT IMAGING | Age: 18
End: 2018-09-18
Attending: EMERGENCY MEDICINE
Payer: COMMERCIAL

## 2018-09-18 ENCOUNTER — HOSPITAL ENCOUNTER (EMERGENCY)
Age: 18
Discharge: HOME | End: 2018-09-18
Attending: EMERGENCY MEDICINE
Payer: COMMERCIAL

## 2018-09-18 VITALS
WEIGHT: 107.58 LBS | DIASTOLIC BLOOD PRESSURE: 69 MMHG | SYSTOLIC BLOOD PRESSURE: 111 MMHG | RESPIRATION RATE: 14 BRPM | OXYGEN SATURATION: 98 % | TEMPERATURE: 98.6 F | HEART RATE: 72 BPM | BODY MASS INDEX: 19.68 KG/M2

## 2018-09-18 DIAGNOSIS — R10.31 ABDOMINAL PAIN, RIGHT LOWER QUADRANT: Primary | ICD-10-CM

## 2018-09-18 LAB
ANION GAP SERPL CALC-SCNC: 11 MMOL/L (ref 5–15)
APPEARANCE UR: ABNORMAL
BACTERIA URNS QL MICRO: ABNORMAL /HPF
BASOPHILS # BLD: 0.1 K/UL (ref 0–0.1)
BASOPHILS NFR BLD: 1 % (ref 0–1)
BILIRUB UR QL: NEGATIVE
BUN SERPL-MCNC: 13 MG/DL (ref 6–20)
BUN/CREAT SERPL: 17 (ref 12–20)
CALCIUM SERPL-MCNC: 8.8 MG/DL (ref 8.5–10.1)
CHLORIDE SERPL-SCNC: 102 MMOL/L (ref 97–108)
CO2 SERPL-SCNC: 25 MMOL/L (ref 21–32)
COLOR UR: ABNORMAL
COMMENT, HOLDF: NORMAL
CREAT SERPL-MCNC: 0.76 MG/DL (ref 0.55–1.02)
DIFFERENTIAL METHOD BLD: NORMAL
EOSINOPHIL # BLD: 0.1 K/UL (ref 0–0.4)
EOSINOPHIL NFR BLD: 1 % (ref 0–7)
EPITH CASTS URNS QL MICRO: ABNORMAL /LPF
ERYTHROCYTE [DISTWIDTH] IN BLOOD BY AUTOMATED COUNT: 13 % (ref 11.5–14.5)
GLUCOSE SERPL-MCNC: 88 MG/DL (ref 65–100)
GLUCOSE UR STRIP.AUTO-MCNC: NEGATIVE MG/DL
HCG UR QL: NEGATIVE
HCG UR QL: NEGATIVE
HCT VFR BLD AUTO: 43 % (ref 35–47)
HGB BLD-MCNC: 14.5 G/DL (ref 11.5–16)
HGB UR QL STRIP: NEGATIVE
IMM GRANULOCYTES # BLD: 0 K/UL (ref 0–0.04)
IMM GRANULOCYTES NFR BLD AUTO: 0 % (ref 0–0.5)
KETONES UR QL STRIP.AUTO: NEGATIVE MG/DL
LEUKOCYTE ESTERASE UR QL STRIP.AUTO: ABNORMAL
LYMPHOCYTES # BLD: 2.6 K/UL (ref 0.8–3.5)
LYMPHOCYTES NFR BLD: 25 % (ref 12–49)
MCH RBC QN AUTO: 32.1 PG (ref 26–34)
MCHC RBC AUTO-ENTMCNC: 33.7 G/DL (ref 30–36.5)
MCV RBC AUTO: 95.1 FL (ref 80–99)
MONOCYTES # BLD: 0.6 K/UL (ref 0–1)
MONOCYTES NFR BLD: 6 % (ref 5–13)
NEUTS SEG # BLD: 6.9 K/UL (ref 1.8–8)
NEUTS SEG NFR BLD: 67 % (ref 32–75)
NITRITE UR QL STRIP.AUTO: NEGATIVE
NRBC # BLD: 0 K/UL (ref 0–0.01)
NRBC BLD-RTO: 0 PER 100 WBC
PH UR STRIP: 5.5 [PH] (ref 5–8)
PLATELET # BLD AUTO: 255 K/UL (ref 150–400)
PMV BLD AUTO: 10.2 FL (ref 8.9–12.9)
POTASSIUM SERPL-SCNC: 4 MMOL/L (ref 3.5–5.1)
PROT UR STRIP-MCNC: NEGATIVE MG/DL
RBC # BLD AUTO: 4.52 M/UL (ref 3.8–5.2)
RBC #/AREA URNS HPF: ABNORMAL /HPF (ref 0–5)
SAMPLES BEING HELD,HOLD: NORMAL
SODIUM SERPL-SCNC: 138 MMOL/L (ref 136–145)
SP GR UR REFRACTOMETRY: 1.02 (ref 1–1.03)
UR CULT HOLD, URHOLD: NORMAL
UROBILINOGEN UR QL STRIP.AUTO: 0.2 EU/DL (ref 0.2–1)
WBC # BLD AUTO: 10.3 K/UL (ref 3.6–11)
WBC URNS QL MICRO: ABNORMAL /HPF (ref 0–4)

## 2018-09-18 PROCEDURE — 87491 CHLMYD TRACH DNA AMP PROBE: CPT | Performed by: EMERGENCY MEDICINE

## 2018-09-18 PROCEDURE — 85025 COMPLETE CBC W/AUTO DIFF WBC: CPT | Performed by: EMERGENCY MEDICINE

## 2018-09-18 PROCEDURE — 81025 URINE PREGNANCY TEST: CPT

## 2018-09-18 PROCEDURE — 96361 HYDRATE IV INFUSION ADD-ON: CPT

## 2018-09-18 PROCEDURE — 76856 US EXAM PELVIC COMPLETE: CPT

## 2018-09-18 PROCEDURE — 74177 CT ABD & PELVIS W/CONTRAST: CPT

## 2018-09-18 PROCEDURE — 80048 BASIC METABOLIC PNL TOTAL CA: CPT | Performed by: EMERGENCY MEDICINE

## 2018-09-18 PROCEDURE — 76830 TRANSVAGINAL US NON-OB: CPT

## 2018-09-18 PROCEDURE — 74011000258 HC RX REV CODE- 258: Performed by: EMERGENCY MEDICINE

## 2018-09-18 PROCEDURE — 74011250636 HC RX REV CODE- 250/636: Performed by: EMERGENCY MEDICINE

## 2018-09-18 PROCEDURE — 36415 COLL VENOUS BLD VENIPUNCTURE: CPT | Performed by: EMERGENCY MEDICINE

## 2018-09-18 PROCEDURE — 96374 THER/PROPH/DIAG INJ IV PUSH: CPT

## 2018-09-18 PROCEDURE — 74011636320 HC RX REV CODE- 636/320: Performed by: EMERGENCY MEDICINE

## 2018-09-18 PROCEDURE — 81001 URINALYSIS AUTO W/SCOPE: CPT | Performed by: EMERGENCY MEDICINE

## 2018-09-18 PROCEDURE — 99284 EMERGENCY DEPT VISIT MOD MDM: CPT

## 2018-09-18 RX ORDER — SODIUM CHLORIDE 9 MG/ML
1000 INJECTION, SOLUTION INTRAVENOUS ONCE
Status: COMPLETED | OUTPATIENT
Start: 2018-09-18 | End: 2018-09-18

## 2018-09-18 RX ORDER — SODIUM CHLORIDE 0.9 % (FLUSH) 0.9 %
10 SYRINGE (ML) INJECTION
Status: COMPLETED | OUTPATIENT
Start: 2018-09-18 | End: 2018-09-18

## 2018-09-18 RX ORDER — ONDANSETRON 4 MG/1
4 TABLET, ORALLY DISINTEGRATING ORAL
Qty: 9 TAB | Refills: 0 | Status: SHIPPED | OUTPATIENT
Start: 2018-09-18

## 2018-09-18 RX ORDER — DICYCLOMINE HYDROCHLORIDE 20 MG/1
20 TABLET ORAL
Qty: 15 TAB | Refills: 0 | Status: SHIPPED | OUTPATIENT
Start: 2018-09-18

## 2018-09-18 RX ORDER — ONDANSETRON 2 MG/ML
4 INJECTION INTRAMUSCULAR; INTRAVENOUS
Status: COMPLETED | OUTPATIENT
Start: 2018-09-18 | End: 2018-09-18

## 2018-09-18 RX ADMIN — SODIUM CHLORIDE 1000 ML: 900 INJECTION, SOLUTION INTRAVENOUS at 18:52

## 2018-09-18 RX ADMIN — ONDANSETRON 4 MG: 2 INJECTION INTRAMUSCULAR; INTRAVENOUS at 18:52

## 2018-09-18 RX ADMIN — IOPAMIDOL 100 ML: 755 INJECTION, SOLUTION INTRAVENOUS at 19:59

## 2018-09-18 RX ADMIN — SODIUM CHLORIDE 100 ML: 900 INJECTION, SOLUTION INTRAVENOUS at 19:58

## 2018-09-18 RX ADMIN — Medication 10 ML: at 19:59

## 2018-09-18 NOTE — LETTER
[address] 
[address] [facility] DEPT 
[address] [place] [address] [facility] [id number] 
[phone] Work/School Note Date: 9/18/2018 To Whom It May concern: 
 
Dago Bass was seen and treated today in the emergency room by the following provider(s): 
Attending Provider: Lauren Guzmán MD 
Physician Assistant: Algernon Saint, PA-C. Dago Bass may return to work on 9/21/18.  
 
Sincerely, 
 
 
 
 
Algernon Saint, PA-C

## 2018-09-18 NOTE — ED NOTES
Pt awake, alert and sitting up in bed. No vomiting noted while in the ED. Pt's abdomen tender to palpation in her right lower abdominal quadrant.

## 2018-09-18 NOTE — ED TRIAGE NOTES
Triage:  Pt states \"I had two episodes of diarrahea this morning and vomited once on the way home from working out\". \"I went to [employer] and they referred me here for right lower belly pain\".

## 2018-09-18 NOTE — ED NOTES
Bedside/ verbal report received from [facility], [facility]. Pt resting comfortably on stretcher in no apparent acute distress. PA aware of plan of care and denies any needs at this time.
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
## 2018-09-18 NOTE — LETTER
[address] 
[address] 
[address] [place] [address] [address] [id number] 
[phone] Work/School Note Date: 9/18/2018 To Whom It May concern: 
 
Wanda Pearl was seen and treated today in the emergency room by the following provider(s): 
Attending Provider: Berry Galeazzi, MD 
Physician Assistant: Mee cMgregor PA-C. Wanda Pearl may return to school on 9/20/18.  
 
Sincerely, 
 
 
 
 
Mee Mcgregor PA-C

## 2018-09-18 NOTE — ED NOTES
Upon reassessment pt denies both nausea and pain at this time. VSS. IVF's complete and pt's respirations remain easy and unlabored. Pt now off the floor to CT.

## 2018-09-19 LAB
C TRACH DNA SPEC QL NAA+PROBE: NEGATIVE
N GONORRHOEA DNA SPEC QL NAA+PROBE: NEGATIVE
SAMPLE TYPE: NORMAL
SERVICE CMNT-IMP: NORMAL
SPECIMEN SOURCE: NORMAL

## 2018-09-19 NOTE — DISCHARGE INSTRUCTIONS

## 2018-12-31 ENCOUNTER — APPOINTMENT (OUTPATIENT)
Dept: CT IMAGING | Age: 18
End: 2018-12-31
Attending: PHYSICIAN ASSISTANT
Payer: COMMERCIAL

## 2018-12-31 ENCOUNTER — HOSPITAL ENCOUNTER (EMERGENCY)
Age: 18
Discharge: HOME | End: 2018-12-31
Attending: PEDIATRICS
Payer: COMMERCIAL

## 2018-12-31 VITALS
TEMPERATURE: 98.5 F | OXYGEN SATURATION: 100 % | SYSTOLIC BLOOD PRESSURE: 138 MMHG | HEART RATE: 105 BPM | RESPIRATION RATE: 16 BRPM | DIASTOLIC BLOOD PRESSURE: 80 MMHG | BODY MASS INDEX: 19.44 KG/M2 | WEIGHT: 106.26 LBS

## 2018-12-31 DIAGNOSIS — S06.0X0A CONCUSSION WITHOUT LOSS OF CONSCIOUSNESS, INITIAL ENCOUNTER: Primary | ICD-10-CM

## 2018-12-31 LAB — HCG UR QL: NEGATIVE

## 2018-12-31 PROCEDURE — 74011250637 HC RX REV CODE- 250/637: Performed by: PHYSICIAN ASSISTANT

## 2018-12-31 PROCEDURE — 70450 CT HEAD/BRAIN W/O DYE: CPT

## 2018-12-31 PROCEDURE — 81025 URINE PREGNANCY TEST: CPT

## 2018-12-31 PROCEDURE — 99283 EMERGENCY DEPT VISIT LOW MDM: CPT

## 2018-12-31 RX ORDER — IBUPROFEN 400 MG/1
400 TABLET ORAL
Qty: 30 TAB | Refills: 0 | Status: SHIPPED | OUTPATIENT
Start: 2018-12-31

## 2018-12-31 RX ORDER — IBUPROFEN 600 MG/1
600 TABLET ORAL
Status: COMPLETED | OUTPATIENT
Start: 2018-12-31 | End: 2018-12-31

## 2018-12-31 RX ADMIN — IBUPROFEN 600 MG: 600 TABLET ORAL at 12:21

## 2018-12-31 NOTE — ED TRIAGE NOTES
Patient fell on Friday while walking up cement stairs and hit the outside of the left eye. Patient complains of headache. No medications PTA. No loss of consciousness at time of injury. Nausea and one episode of vomiting.

## 2018-12-31 NOTE — DISCHARGE INSTRUCTIONS
Concussion: Care Instructions  Your Care Instructions    A concussion is a kind of injury to the brain. It happens when the head receives a hard blow. The impact can jar or shake the brain against the skull. This interrupts the brain's normal activities. Although you may have cuts or bruises on your head or face, you may have no other visible signs of a brain injury. In most cases, damage to the brain from a concussion can't be seen in tests such as a CT or MRI scan. For a few weeks, you may have low energy, dizziness, trouble sleeping, a headache, ringing in your ears, or nausea. You may also feel anxious, grumpy, or depressed. You may have problems with memory and concentration. These symptoms are common after a concussion. They should slowly improve over time. Sometimes this takes weeks or even months. Someone who lives with you should know how to care for you. Please share this and all information with a caregiver who will be available to help if needed. Follow-up care is a key part of your treatment and safety. Be sure to make and go to all appointments, and call your doctor if you are having problems. It's also a good idea to know your test results and keep a list of the medicines you take. How can you care for yourself at home? Pain control  · Put ice or a cold pack on the part of your head that hurts for 10 to 20 minutes at a time. Put a thin cloth between the ice and your skin. · Be safe with medicines. Read and follow all instructions on the label. ? If the doctor gave you a prescription medicine for pain, take it as prescribed. ? If you are not taking a prescription pain medicine, ask your doctor if you can take an over-the-counter medicine. Recovery  · Follow your doctor's instructions. He or she will tell you if you need someone to watch you closely for the next 24 hours or longer. · Rest is the best way to recover from a concussion.  You need to rest your body and your brain:  ? Get plenty of sleep at night. And take rest breaks during the day. ? Avoid activities that take a lot of physical or mental work. This includes housework, exercise, schoolwork, video games, text messaging, and using the computer. ? You may need to change your school or work schedule while you recover. ? Return to your normal activities slowly. Do not try to do too much at once. · Do not drink alcohol or use illegal drugs. Alcohol and illegal drugs can slow your recovery. And they can increase your risk of a second brain injury. · Avoid activities that could lead to another concussion. Follow your doctor's instructions for a gradual return to activity and sports. · Ask your doctor when it's okay for you to drive a car, ride a bike, or operate machinery. How should you return to activity? Your return to activity can begin after 1 to 2 days of physical and mental rest. After resting, you can gradually increase your activity as long as it does not cause new symptoms or worsen your symptoms. Doctors and concussion specialists suggest steps to follow for returning to sports after a concussion. Use these steps as a guide. You should slowly progress through the following levels of activity:  1. Limited activity. You can take part in daily activities as long as the activity doesn't increase your symptoms or cause new symptoms. 2. Light aerobic activity. This can include walking, swimming, or other exercise at less than 70% of maximum heart rate. No resistance training is included in this step. 3. Sport-specific exercise. This includes running drills or skating drills (depending on the sport), but no head impact. 4. Noncontact training drills. This includes more complex training drills such as passing. The athlete may also begin light resistance training. 5. Full-contact practice. The athlete can participate in normal training. 6. Return to normal game play.  This is the final step and allows the athlete to join in normal game play. Watch and keep track of your progress. It should take at least 6 days for you to go from light activity to normal game play. Make sure that you can stay at each new level of activity for at least 24 hours without symptoms, or as long as your doctor says, before doing more. If one or more symptoms come back, return to a lower level of activity for at least 24 hours. Don't move on until all symptoms are gone. When should you call for help? Call 911 anytime you think you may need emergency care. For example, call if:    · You have a seizure.     · You passed out (lost consciousness).     · You are confused or can't stay awake.    Call your doctor now or seek immediate medical care if:    · You have new or worse vomiting.     · You feel less alert.     · You have new weakness or numbness in any part of your body.    Watch closely for changes in your health, and be sure to contact your doctor if:    · You do not get better as expected.     · You have new symptoms, such as headaches, trouble concentrating, or changes in mood. Where can you learn more? Go to http://sarah-sean.info/. Enter E741 in the search box to learn more about \"Concussion: Care Instructions. \"  Current as of: September 10, 2017  Content Version: 11.8  © 9914-8435 Healthwise, Incorporated. Care instructions adapted under license by [employer] (which disclaims liability or warranty for this information). If you have questions about a medical condition or this instruction, always ask your healthcare professional. [place] [address] any warranty or liability for your use of this information.
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
## 2018-12-31 NOTE — LETTER
[address] 
[address] 
[address] [facility] [address] [phone] Work/School Note Date: 12/31/2018 To Whom It May concern: 
 
Isabel Mackay was seen and treated today in the emergency room by the following provider(s): 
Attending Provider: Ana Bustamante MD 
Physician Assistant: DOMENICO Torres. Isabel Mackay may return to work on 1/2/18. Sincerely, DOMENICO Meyers

## 2019-11-30 ENCOUNTER — HOSPITAL ENCOUNTER (EMERGENCY)
Age: 19
Discharge: HOME | End: 2019-11-30
Attending: EMERGENCY MEDICINE
Payer: COMMERCIAL

## 2019-11-30 ENCOUNTER — APPOINTMENT (OUTPATIENT)
Dept: ULTRASOUND IMAGING | Age: 19
End: 2019-11-30
Attending: PHYSICIAN ASSISTANT
Payer: COMMERCIAL

## 2019-11-30 VITALS
BODY MASS INDEX: 18.5 KG/M2 | HEART RATE: 68 BPM | TEMPERATURE: 98.8 F | SYSTOLIC BLOOD PRESSURE: 107 MMHG | RESPIRATION RATE: 16 BRPM | DIASTOLIC BLOOD PRESSURE: 60 MMHG | OXYGEN SATURATION: 98 % | HEIGHT: 63 IN | WEIGHT: 104.4 LBS

## 2019-11-30 DIAGNOSIS — Z3A.01 LESS THAN 8 WEEKS GESTATION OF PREGNANCY: Primary | ICD-10-CM

## 2019-11-30 LAB
ALBUMIN SERPL-MCNC: 4.4 G/DL (ref 3.5–5)
ALBUMIN/GLOB SERPL: 1 {RATIO} (ref 1.1–2.2)
ALP SERPL-CCNC: 74 U/L (ref 45–117)
ALT SERPL-CCNC: 20 U/L (ref 12–78)
ANION GAP SERPL CALC-SCNC: 11 MMOL/L (ref 5–15)
APPEARANCE UR: ABNORMAL
AST SERPL-CCNC: 13 U/L (ref 15–37)
BACTERIA URNS QL MICRO: ABNORMAL /HPF
BASOPHILS # BLD: 0 K/UL (ref 0–0.1)
BASOPHILS NFR BLD: 0 % (ref 0–1)
BILIRUB SERPL-MCNC: 0.7 MG/DL (ref 0.2–1)
BILIRUB UR QL: NEGATIVE
BUN SERPL-MCNC: 12 MG/DL (ref 6–20)
BUN/CREAT SERPL: 18 (ref 12–20)
CALCIUM SERPL-MCNC: 9.4 MG/DL (ref 8.5–10.1)
CHLORIDE SERPL-SCNC: 103 MMOL/L (ref 97–108)
CO2 SERPL-SCNC: 21 MMOL/L (ref 21–32)
COLOR UR: ABNORMAL
COMMENT, HOLDF: NORMAL
CREAT SERPL-MCNC: 0.68 MG/DL (ref 0.55–1.02)
DIFFERENTIAL METHOD BLD: ABNORMAL
EOSINOPHIL # BLD: 0 K/UL (ref 0–0.4)
EOSINOPHIL NFR BLD: 0 % (ref 0–7)
EPITH CASTS URNS QL MICRO: ABNORMAL /LPF
ERYTHROCYTE [DISTWIDTH] IN BLOOD BY AUTOMATED COUNT: 12.1 % (ref 11.5–14.5)
GLOBULIN SER CALC-MCNC: 4.2 G/DL (ref 2–4)
GLUCOSE SERPL-MCNC: 91 MG/DL (ref 65–100)
GLUCOSE UR STRIP.AUTO-MCNC: NEGATIVE MG/DL
HCG SERPL-ACNC: ABNORMAL MIU/ML (ref 0–6)
HCG UR QL: POSITIVE
HCT VFR BLD AUTO: 42 % (ref 35–47)
HGB BLD-MCNC: 14.6 G/DL (ref 11.5–16)
HGB UR QL STRIP: NEGATIVE
IMM GRANULOCYTES # BLD AUTO: 0 K/UL (ref 0–0.04)
IMM GRANULOCYTES NFR BLD AUTO: 0 % (ref 0–0.5)
KETONES UR QL STRIP.AUTO: >80 MG/DL
LEUKOCYTE ESTERASE UR QL STRIP.AUTO: NEGATIVE
LYMPHOCYTES # BLD: 1.3 K/UL (ref 0.8–3.5)
LYMPHOCYTES NFR BLD: 13 % (ref 12–49)
MCH RBC QN AUTO: 31.9 PG (ref 26–34)
MCHC RBC AUTO-ENTMCNC: 34.8 G/DL (ref 30–36.5)
MCV RBC AUTO: 91.7 FL (ref 80–99)
MONOCYTES # BLD: 0.8 K/UL (ref 0–1)
MONOCYTES NFR BLD: 7 % (ref 5–13)
MUCOUS THREADS URNS QL MICRO: ABNORMAL /LPF
NEUTS SEG # BLD: 8.3 K/UL (ref 1.8–8)
NEUTS SEG NFR BLD: 80 % (ref 32–75)
NITRITE UR QL STRIP.AUTO: NEGATIVE
NRBC # BLD: 0 K/UL (ref 0–0.01)
NRBC BLD-RTO: 0 PER 100 WBC
PH UR STRIP: 6 [PH] (ref 5–8)
PLATELET # BLD AUTO: 288 K/UL (ref 150–400)
PMV BLD AUTO: 9.9 FL (ref 8.9–12.9)
POTASSIUM SERPL-SCNC: 3.5 MMOL/L (ref 3.5–5.1)
PROT SERPL-MCNC: 8.6 G/DL (ref 6.4–8.2)
PROT UR STRIP-MCNC: 30 MG/DL
RBC # BLD AUTO: 4.58 M/UL (ref 3.8–5.2)
RBC #/AREA URNS HPF: ABNORMAL /HPF (ref 0–5)
SAMPLES BEING HELD,HOLD: NORMAL
SODIUM SERPL-SCNC: 135 MMOL/L (ref 136–145)
SP GR UR REFRACTOMETRY: 1.03 (ref 1–1.03)
UR CULT HOLD, URHOLD: NORMAL
UROBILINOGEN UR QL STRIP.AUTO: 0.2 EU/DL (ref 0.2–1)
WBC # BLD AUTO: 10.4 K/UL (ref 3.6–11)
WBC URNS QL MICRO: ABNORMAL /HPF (ref 0–4)

## 2019-11-30 PROCEDURE — 99284 EMERGENCY DEPT VISIT MOD MDM: CPT

## 2019-11-30 PROCEDURE — 84702 CHORIONIC GONADOTROPIN TEST: CPT

## 2019-11-30 PROCEDURE — 74011250636 HC RX REV CODE- 250/636: Performed by: EMERGENCY MEDICINE

## 2019-11-30 PROCEDURE — 96375 TX/PRO/DX INJ NEW DRUG ADDON: CPT

## 2019-11-30 PROCEDURE — 74011250636 HC RX REV CODE- 250/636: Performed by: PHYSICIAN ASSISTANT

## 2019-11-30 PROCEDURE — 96374 THER/PROPH/DIAG INJ IV PUSH: CPT

## 2019-11-30 PROCEDURE — 76817 TRANSVAGINAL US OBSTETRIC: CPT

## 2019-11-30 PROCEDURE — 36415 COLL VENOUS BLD VENIPUNCTURE: CPT

## 2019-11-30 PROCEDURE — 76801 OB US < 14 WKS SINGLE FETUS: CPT

## 2019-11-30 PROCEDURE — 80053 COMPREHEN METABOLIC PANEL: CPT

## 2019-11-30 PROCEDURE — 81025 URINE PREGNANCY TEST: CPT

## 2019-11-30 PROCEDURE — 85025 COMPLETE CBC W/AUTO DIFF WBC: CPT

## 2019-11-30 PROCEDURE — 81001 URINALYSIS AUTO W/SCOPE: CPT

## 2019-11-30 RX ORDER — ONDANSETRON 2 MG/ML
4 INJECTION INTRAMUSCULAR; INTRAVENOUS
Status: COMPLETED | OUTPATIENT
Start: 2019-11-30 | End: 2019-11-30

## 2019-11-30 RX ORDER — DIPHENHYDRAMINE HYDROCHLORIDE 50 MG/ML
50 INJECTION, SOLUTION INTRAMUSCULAR; INTRAVENOUS
Status: COMPLETED | OUTPATIENT
Start: 2019-11-30 | End: 2019-11-30

## 2019-11-30 RX ADMIN — ONDANSETRON 4 MG: 2 INJECTION INTRAMUSCULAR; INTRAVENOUS at 16:24

## 2019-11-30 RX ADMIN — SODIUM CHLORIDE 1000 ML: 900 INJECTION, SOLUTION INTRAVENOUS at 16:24

## 2019-11-30 RX ADMIN — DIPHENHYDRAMINE HYDROCHLORIDE 50 MG: 50 INJECTION, SOLUTION INTRAMUSCULAR; INTRAVENOUS at 16:34

## 2019-11-30 NOTE — DISCHARGE INSTRUCTIONS
Patient Education        Extreme Nausea and Vomiting in Pregnancy: Care Instructions  Your Care Instructions    Nausea and vomiting (often called morning sickness) are common in pregnancy. They are caused by pregnancy hormones and happen most often in the first 3 months. Some women get very sick and are not able to keep down food and fluids. This extreme morning sickness is called hyperemesis gravidarum. It can lead to a dangerous loss of fluids in the body. It also can keep you from gaining weight and getting proper nutrition during your pregnancy. Your body fluids are put back in balance with water and minerals called electrolytes. Medicine may help if you have severe nausea and vomiting. Follow-up care is a key part of your treatment and safety. Be sure to make and go to all appointments, and call your doctor if you are having problems. It's also a good idea to know your test results and keep a list of the medicines you take. How can you care for yourself at home? · Take your medicines exactly as prescribed. Call your doctor if you think you are having a problem with your medicine. · Drink plenty of fluids to prevent dehydration. Choose water and other caffeine-free clear liquids until you feel better. Try sipping on sports drinks that have salt and sugar in them. · Eat a small snack, such as crackers, before you get out of bed. Wait a few minutes, then get out of bed slowly. · Keep food in your stomach, but not too much at once. An empty stomach can make nausea worse. Eat several small meals every day instead of three large meals. · Eat more protein and less fat. · Get plenty of vitamin B6 by eating whole grains, nuts, seeds, and legumes. You can take vitamin B6 tablets if your doctor says it is okay. · Try to avoid smells and foods that make you feel sick to your stomach. · Get lots of rest.  · You may want to try acupressure bands.  They put pressure on an acupressure point in the wrist. Some women feel better using the bands. · Kim may also help you feel better. You can use it in tea, take it as a pill, or use a kim syrup that you can buy at a health food store. When should you call for help? Call 911 anytime you think you may need emergency care. For example, call if:    · You passed out (lost consciousness).    Call your doctor now or seek immediate medical care if:    · You are sick to your stomach or cannot drink fluids.     · You have symptoms of dehydration, such as:  ? Dry eyes and a dry mouth. ? Passing only a little urine. ? Feeling thirstier than usual.     · You are not able to keep down your medicines.     · You have pain in your belly or pelvis.    Watch closely for changes in your health, and be sure to contact your doctor if:    · You do not get better as expected. Where can you learn more? Go to http://sarah-sean.info/. Enter D276 in the search box to learn more about \"Extreme Nausea and Vomiting in Pregnancy: Care Instructions. \"  Current as of: May 29, 2019  Content Version: 12.2  © 9398-3670 [employer]. Care instructions adapted under license by [employer] (which disclaims liability or warranty for this information). If you have questions about a medical condition or this instruction, always ask your healthcare professional. [address] any warranty or liability for your use of this information.

## 2019-11-30 NOTE — ED TRIAGE NOTES
5 weeks 5 days pregnant, has been unable to keep anything down for 2 days. This seems different than vomiting that had been occurring just in the mornings, started feeling fatigue, ill, low grade fever Tuesday then started vomiting Thursday and hasn't stopped. No diarrhea, has some cramping in upper stomach. No vaginal drainage.
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
## 2019-11-30 NOTE — LETTER
[address] 
[facility] EMERGENCY DEPT 
[address] [facility] [place] [address] [phone] Work/School Note Date: 11/30/2019 To Whom It May concern: 
 
Rozina Azevedo was seen and treated today in the emergency room by the following provider(s): 
Attending Provider: Alejandra Conway MD. Rozina Azevedo may return to work on 12/2/19.  
 
Sincerely,

## 2021-04-20 ENCOUNTER — OFFICE VISIT (OUTPATIENT)
Dept: PRIMARY CARE CLINIC | Age: 21
End: 2021-04-20
Payer: COMMERCIAL

## 2021-04-20 VITALS
HEART RATE: 90 BPM | RESPIRATION RATE: 16 BRPM | BODY MASS INDEX: 23.92 KG/M2 | DIASTOLIC BLOOD PRESSURE: 82 MMHG | HEIGHT: 63 IN | WEIGHT: 135 LBS | TEMPERATURE: 98.4 F | OXYGEN SATURATION: 97 % | SYSTOLIC BLOOD PRESSURE: 124 MMHG

## 2021-04-20 DIAGNOSIS — Z76.89 ENCOUNTER TO ESTABLISH CARE: ICD-10-CM

## 2021-04-20 DIAGNOSIS — B00.1 RECURRENT HERPES LABIALIS: Primary | ICD-10-CM

## 2021-04-20 PROCEDURE — 99203 OFFICE O/P NEW LOW 30 MIN: CPT | Performed by: NURSE PRACTITIONER

## 2021-04-20 RX ORDER — QUETIAPINE FUMARATE 300 MG/1
300 TABLET, FILM COATED ORAL
COMMUNITY
Start: 2021-03-09

## 2021-04-20 RX ORDER — VALACYCLOVIR HYDROCHLORIDE 500 MG/1
TABLET, FILM COATED ORAL
Qty: 30 TAB | Refills: 1 | Status: SHIPPED | OUTPATIENT
Start: 2021-04-20
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
## 2022-03-18 PROBLEM — F32.9 MDD (MAJOR DEPRESSIVE DISORDER): Status: ACTIVE | Noted: 2018-02-11

## 2023-05-20 RX ORDER — IBUPROFEN 400 MG/1
400 TABLET ORAL EVERY 6 HOURS PRN
COMMUNITY
Start: 2018-12-31

## 2023-05-20 RX ORDER — QUETIAPINE FUMARATE 300 MG/1
300 TABLET, FILM COATED ORAL NIGHTLY
COMMUNITY
Start: 2021-03-09

## 2023-05-20 RX ORDER — DICYCLOMINE HCL 20 MG
20 TABLET ORAL EVERY 6 HOURS PRN
COMMUNITY
Start: 2018-09-18

## 2023-05-20 RX ORDER — VALACYCLOVIR HYDROCHLORIDE 500 MG/1
TABLET, FILM COATED ORAL
COMMUNITY
Start: 2021-04-20

## 2023-05-20 RX ORDER — ZOLPIDEM TARTRATE 10 MG/1
10 TABLET ORAL
COMMUNITY
Start: 2018-02-16

## 2023-05-20 RX ORDER — ONDANSETRON 4 MG/1
4 TABLET, ORALLY DISINTEGRATING ORAL EVERY 8 HOURS PRN
COMMUNITY
Start: 2018-09-18

## 2023-05-20 RX ORDER — CLONIDINE HYDROCHLORIDE 0.1 MG/1
0.1 TABLET ORAL
COMMUNITY
Start: 2018-02-16

## 2023-05-20 RX ORDER — SERTRALINE HYDROCHLORIDE 100 MG/1
TABLET, FILM COATED ORAL DAILY
COMMUNITY

## 2023-05-20 RX ORDER — CITALOPRAM 20 MG/1
20 TABLET ORAL DAILY
COMMUNITY
Start: 2018-02-16

## 2023-05-20 RX ORDER — NORETHINDRONE ACETATE AND ETHINYL ESTRADIOL 1; .02 MG/1; MG/1
TABLET ORAL
COMMUNITY

## 2023-05-20 RX ORDER — NORETHINDRONE ACETATE AND ETHINYL ESTRADIOL 1MG-20(21)
1 KIT ORAL DAILY
COMMUNITY